# Patient Record
(demographics unavailable — no encounter records)

---

## 2024-07-23 NOTE — HISTORY OF PRESENT ILLNESS
[FreeTextEntry1] : 80 year old gentleman with history of HTN, CKD, HF, early dementia/falls, hypothyroidism, permanent AF on rate control, brain bleed, COPD, asthma, IGNACIA on nocturnal CPAP, mild LV dysfunction. He was deemed at high risk for recurrent brain bleed on long term anticoagulation, and at high risk of CVA off anticoagulation. He is now s/p MARIBETH occlusion with Watchman implant 11/9/21.Given his very high risk of bleeding, aspirin was not added to his Xarelto.  He had difficulty with rate control of his AF with occasions of prolonged tachycardia and time of bradycardia, therefore he underwent ILR implant 11/10/21. ILR has shown rate controlled persistent AF. He underwent a 45 post Watchman FREDDY which showed excellent results. His AC was stopped and he was started on DAPT which he has been tolerating well. Plavix was stopped at 6 months post WATCHMAN and he remains on ASA 81mg.  FREDDY and subsequent TTE reported new LV dysfunction with EF of 30-35% (previously 45%). Started on Entresto by Dr. Mackenzie. On 5/19/22, ILR notable for nonsustained MMVT - 7 seconds at 180bpm. Was Asymptomatic during NSVT event. Otherwise shows rate controlled AF with NO corina or tachyarrhythmia  Repeat TTE showed improved LVEF 50-55% but with regional wall abnormalities in the inferior and inferoseptal walls. Given recent findings of NSVT recommend to undergo pharm nuclear stress test for ischemic evaluation- no clear evidence of ischemia.   FREDDY on 6/2023. This showed LVEF continues to be 50--55% and with small (2.7 mm) leak around his Watchman device but no DRT.  ILR FOR RATE CONTROL INCOMPLETE NOTE NOT YET SEEN

## 2024-11-15 NOTE — ASSESSMENT
[FreeTextEntry1] : This is a 81-year-old man with dementia.  He has a prominent language component to it that this may be Alzheimer's disease versus a frontal temporal dementia.  To differentiate between the 2 I like to check PET scan for dementia.  I will continue his donepezil as he is tolerating it.  I will see him back in the office in 6 months but call his wife with the results of the PET scan and make any different treatment decisions as needed based upon the results.

## 2024-11-15 NOTE — CONSULT LETTER
[Dear  ___] : Dear  [unfilled], [Courtesy Letter:] : I had the pleasure of seeing your patient, [unfilled], in my office today. [Please see my note below.] : Please see my note below. [Consult Closing:] : Thank you very much for allowing me to participate in the care of this patient.  If you have any questions, please do not hesitate to contact me. [Sincerely,] : Sincerely, [FreeTextEntry3] : Toñito Toro M.D., Ph.D. DPN-N [facility] [place] Director, Division of Neurology Director, [facility]

## 2024-11-15 NOTE — HISTORY OF PRESENT ILLNESS
[FreeTextEntry1] : Initial office visit July 30, 2021: This is a 78-year-old man presents today for evaluation of memory loss. His wife accompanies him and provides additional history. She states over the past 2 years she's been having progressive memory problems. This is also accompanied by word finding difficulties and difficulty with her. He is repeating things he needs things repeated to him, he is having trouble managing his checkbook and his daughter has taken over that function. He is not driving currently. He is not wandering. He is having some aggressive behavior at times and prostration of memory loss. He is here today for neurologic evaluation.  Followup September 27, 2021: This is a 78-year-old man who presents today for followup of memory loss and agitation. His wife accompanies him and tells me that he is doing better. He has difficulty with finding words as well as some of his agitation aggression have improved since starting on the had an episode. He is not experiencing any side effects. He is here today for followup.  Followup February 15, 2022: This is a 78-year-old man who presents today for followup of dementia. His wife accompanies him and provides additional history. He has been fairly stable with recent memory loss. His wife states that he does reminisce a lot more about the past and having some trouble remembering some of the details. He has not progressed significantly since his last visit in September. He is here today for neurologic followup. He continues to take Aricept 10 mg daily.  Follow-up November 30, 2022: This is a 79-year-old man who presents today with dementia.  His wife accompanies him and provides additional history.  She states that his memory is a little bit worse than when he saw him last in February.  She states his judgment and decision-making skills however remain okay.  He continues to take Aricept 10 mg daily without problems.  He is here today for neurologic follow-up.  Follow-up March 10, 2023: This is a 79-year-old man who presents today with dementia.  His wife accompanies him and provides additional history.  She states that he has been fairly stable on donepezil 10 mg daily.  Except for over the past week he has been a bit off, this is likely due to anxiety for upcoming surgery to repair an umbilical hernia next week.  Other than that he has been fairly stable.  He is here today for neurologic follow-up.  Follow-up September 8, 2023: This is an 80-year-old man who presents today with dementia.  His wife accompanies him and provides additional history.  They both state that he has been doing okay.  She states that as long as things are at his pace he is doing well and able to remember.  When he is out with other people if he gets peppered with questions too quickly he may have difficulty answering some questions.  He states that he relies on her for certain things such as the date and she does most of the driving.  He does not drive unattended.  He is overall fairly stable since his last visit.  Follow-up July 23, 2024: This is an 80-year-old man who presents with dementia.  His wife accompanies him.  They state that he is may be a little bit worse.  He is also having problems now with walking.  He is unsteady and almost falls at times.  He has incontinence of urine but that is been present for years.  He is continuing to take donepezil and tolerating it well.  He is here today for follow-up.  Follow-up November 15, 2024: This is an 81-year-old man who presents with dementia.  His wife and aide accompany him.  She feels that that his forgetfulness is getting worse.  He is having language involvement as well.  He is having trouble walking and is unsteady and has been falling.  He also has incontinence of urine.  He has been taking donepezil and tolerating it well.  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  He is here today for neurologic follow-up.

## 2024-11-15 NOTE — PHYSICAL EXAM
[General Appearance - Alert] : alert [General Appearance - In No Acute Distress] : in no acute distress [General Appearance - Well Nourished] : well nourished [General Appearance - Well Developed] : well developed [Person] : oriented to person [Place] : oriented to place [Time] : disoriented to time [Short Term Intact] : short term memory impaired [Remote Intact] : remote memory intact [Registration Intact] : recent registration memory intact [Span Intact] : the attention span was decreased [Concentration Intact] : normal concentrating ability [Visual Intact] : visual attention was ~T not ~L decreased [Naming Objects] : no difficulty naming common objects [Repeating Phrases] : no difficulty repeating a phrase [Fluency] : fluency intact [Comprehension] : comprehension intact [Past History] : adequate knowledge of personal past history [Cranial Nerves Optic (II)] : visual acuity intact bilaterally,  visual fields full to confrontation, pupils equal round and reactive to light [Cranial Nerves Oculomotor (III)] : extraocular motion intact [Cranial Nerves Trigeminal (V)] : facial sensation intact symmetrically [Cranial Nerves Facial (VII)] : face symmetrical [Cranial Nerves Vestibulocochlear (VIII)] : hearing was intact bilaterally [Cranial Nerves Glossopharyngeal (IX)] : tongue and palate midline [Cranial Nerves Accessory (XI - Cranial And Spinal)] : head turning and shoulder shrug symmetric [Cranial Nerves Hypoglossal (XII)] : there was no tongue deviation with protrusion [Motor Tone] : muscle tone was normal in all four extremities [Motor Strength] : muscle strength was normal in all four extremities [Involuntary Movements] : no involuntary movements were seen [No Muscle Atrophy] : normal bulk in all four extremities [Paresis Pronator Drift Right-Sided] : no pronator drift on the right [Paresis Pronator Drift Left-Sided] : no pronator drift on the left [Motor Strength Upper Extremities Bilaterally] : strength was normal in both upper extremities [Motor Strength Lower Extremities Bilaterally] : strength was normal in both lower extremities [Sensation Tactile Decrease] : light touch was intact [Sensation Pain / Temperature Decrease] : pain and temperature was intact [Sensation Vibration Decrease] : vibration was intact [Proprioception] : proprioception was intact [Tremor] : no tremor present [Coordination - Dysmetria Impaired Finger-to-Nose Bilateral] : not present [1+] : Patella left 1+ [FreeTextEntry4] : 2 out of 3 recall at 5 minutes, 3 out of 3 with prompts [FreeTextEntry8] : slow cautious gait [Sclera] : the sclera and conjunctiva were normal [PERRL With Normal Accommodation] : pupils were equal in size, round, reactive to light, with normal accommodation [Extraocular Movements] : extraocular movements were intact [No APD] : no afferent pupillary defect [No ALTON] : no internuclear ophthalmoplegia [Full Visual Field] : full visual field

## 2024-11-15 NOTE — REVIEW OF SYSTEMS
[As Noted in HPI] : as noted in HPI [Confused or Disoriented] : confusion [Memory Lapses or Loss] : memory loss [Decr. Concentrating Ability] : decreased concentrating ability [Repeating Questions] : repeated questioning about recent events [Difficulty Walking] : difficulty walking [Negative] : Heme/Lymph

## 2024-12-02 NOTE — PHYSICAL EXAM
[No Acute Distress] : no acute distress [Normal S1, S2] : normal S1, S2 [Clear Lung Fields] : clear lung fields [No Respiratory Distress] : no respiratory distress  [No Edema] : no edema [Moves all extremities] : moves all extremities [Alert and Oriented] : alert and oriented

## 2024-12-02 NOTE — REASON FOR VISIT
[Arrhythmia/ECG Abnorrmalities] : arrhythmia/ECG abnormalities [Follow-up Device Check] : is here today for a follow-up device check visit for [Spouse] : spouse

## 2024-12-03 NOTE — END OF VISIT
[Time Spent: ___ minutes] : I have spent [unfilled] minutes of time on the encounter which excludes teaching and separately reported services. [FreeTextEntry3] : I, Dr. Conrad, personally performed the evaluation and management (E/M) services for this new patient. That E/M includes conducting the clinically appropriate initial history &/or exam, assessing all conditions, and establishing the plan of care. Today, my assistant, Elinor Argueta NP, was here to observe my evaluation and management service for this patient & follow plan of care established by me going forward.I, Dr. Conrad, personally performed the evaluation and management (E/M) services for this new patient. That E/M includes conducting the clinically appropriate initial history &/or exam, assessing all conditions, and establishing the plan of care. Today, my assistant, Elinor Argueta NP, was here to observe my evaluation and management service for this patient & follow plan of care established by me going forward.

## 2024-12-03 NOTE — REVIEW OF SYSTEMS
[Fever] : no fever [Headache] : no headache [Chills] : no chills [Feeling Fatigued] : not feeling fatigued [SOB] : no shortness of breath [Dyspnea on exertion] : not dyspnea during exertion [Chest Discomfort] : no chest discomfort [Lower Ext Edema] : no extremity edema [Palpitations] : no palpitations [Orthopnea] : no orthopnea [PND] : no PND [Syncope] : no syncope [Cough] : no cough [Wheezing] : no wheezing [Nausea] : no nausea [Vomiting] : no vomiting [Dizziness] : no dizziness [Easy Bleeding] : no tendency for easy bleeding [Easy Bruising] : no tendency for easy bruising

## 2024-12-03 NOTE — DISCUSSION/SUMMARY
[EKG obtained to assist in diagnosis and management of assessed problem(s)] : EKG obtained to assist in diagnosis and management of assessed problem(s) [FreeTextEntry1] : 81-year-old gentleman with history of HTN, CKD, HF, early dementia/falls, hypothyroidism, permanent AF on rate control, brain bleed, COPD, asthma, IGNACIA on nocturnal CPAP, mild LV dysfunction. He was deemed at high risk for recurrent brain bleed on long term anticoagulation, and at high risk of CVA off anticoagulation. He is now s/p MARIBETH occlusion with Watchman implant 11/9/21.Given his very high risk of bleeding, Xarelto was no restarted and patient now remains on ASA only. ILR implanted 11/2021.   Patient presented today for arrhythmia f/u. He has been doing well from an arrhythmia standpoint. He remains in permanent A-fib but rates are controlled on Metoprolol 100mg and Digoxin. He is feeling well overall. No other arrhythmia events noted on ILR. Will discuss at next visit whether he wants to continue ILR monitoring or explant.    Recommendations -Continue remote monitoring of ILR.  - Continue current dose of Metoprolol ER 100mg QD.  - Continue Lifelong ASA as tolerated - Continue GDMT per Dr. Mackenzie -EP f/u annually or sooner PRN.

## 2024-12-03 NOTE — ASSESSMENT
[FreeTextEntry1] : Patient with permanent AF/AFl, mostly rate controlled on current medicatiosn. Has ILR which showed reasonable rate control. Had intolerance to AC in the past and now s/p MARIBETH closure with Watchman as above. Overall doing well and denies CP, SOB, KRAUS, dizziness, palpitations, syncope or presyncope. - C/w current dose of BB, digoxin and aspirin. - c/w remote monitoring of his ILR. - EP follow up in 9 months or sooner if needed. - c/w routine cardiac follow up with Dr. Mackenzie.

## 2024-12-03 NOTE — HISTORY OF PRESENT ILLNESS
[FreeTextEntry1] : 81-year-old gentleman with history of HTN, CKD, HF, early dementia/falls, hypothyroidism, permanent AF on rate control, brain bleed, COPD, asthma, IGNACIA on nocturnal CPAP, mild LV dysfunction. He was deemed at high risk for recurrent brain bleed on long term anticoagulation, and at high risk of CVA off anticoagulation. He is now s/p MARIBETH occlusion with Watchman implant 11/9/21.Given his very high risk of bleeding, aspirin was not added to his Xarelto.   He had difficulty with rate control of his AF with occasions of prolonged tachycardia and time of bradycardia, therefore he underwent ILR implant 11/10/21. ILR has shown rate controlled persistent AF.  He underwent a 45 day post Watchman FREDDY which showed excellent results. His AC was stopped and he was started on DAPT which he has been tolerating well.  Plavix was stopped at 6 months post WATCHMAN and he remains on ASA 81mg.   FREDDY and subseuqent TTE reported new LV dysfunction with EF of 30-35% (previously 45%).  Started on Entresto by Dr. Mackenzie.  On 5/19/22, ILR notable for non-sustained MMVT, 7 seconds at 180bpm. Was asymptomatic during NSVT event.  Otherwise shows rate-controlled AF with NO corina or tachyarrhythmia   Repeat TTE showed improved LVEF 50-55% but with regional wall abnormalities in the inferior and inferoseptal walls. Given recent findings of NSVT recommend to undergo pharm nuclear stress test for ischemic evaluation. No longer candidate for primary prevention ICD. Small (2.7 mm) leak around his Watchman device but no DRT. He remained on aspirin.   He presents today for arrhythmia f/u. He is in persistent Afib with rate control. He denies symptoms of arrhythmia without c/o palpitations, chest pain/tightness, dizziness, syncope or near syncope.  ILR interrogation reveals % burden, no other arrhythmia events noted.   EKG today reveals:  AF HR 80.

## 2024-12-16 NOTE — HISTORY OF PRESENT ILLNESS
[de-identified] : 82 y/o male with pmhx of HLD, Alzheimer's, hypothyroidism, a-fib s/p Xarelto now on DAPT per cardio, CHF (EF 45%), COPD/asthma, IGNACIA on CPAP, HTN, s/p Watchman and ILR on 11/09/2021 presents for follow up. Patient accompanied by wife. He is overall stable, no acute issues  Wife notes that patient recently had what she thought was cellulitis on his right foot. It was painful and warm. She gave him antibiotics which she states resolved. States skin is very shiny still with chronic discoloration and swelling.  Care team: Neurosx: Lupe Cardio: Gurdeep Neuro: Rick Pulm: Renee GI: [facility] at [facility]

## 2024-12-16 NOTE — ASSESSMENT
[FreeTextEntry1] : Hypothyroidism will check tfts today Continue levothyroxine 175 mcg QAM  Dementia continue memantine 10mg daily and donepezil 10mg daily continue to follow with neuro  HTN CKD-3 Controlled on current regimen. Managed by cardio. Will see nephrology Dr Hernandez. Advised to avoid nephrotoxic medications, increase his oral hydration.  COPD following with pulm continue trelegy ellipta and levalbuterol  A-fib, rate controlled. Managed by cardio. continue digoxin 125mcg every other day and metoprolol succinate 100mg daily S/p Watchman, still on Xarelto  CHF euvolemic on exam on furosemide 40mg daily   Skin discoloration will refer to vacsular for further evaluation

## 2024-12-16 NOTE — PHYSICAL EXAM
[No Focal Deficits] : no focal deficits [Normal] : affect was normal and insight and judgment were intact [de-identified] : 1+ pitting edema b/l LE [de-identified] : chronic skin discoloration b/l LE

## 2024-12-17 NOTE — DISCUSSION/SUMMARY
[EKG obtained to assist in diagnosis and management of assessed problem(s)] : EKG obtained to assist in diagnosis and management of assessed problem(s) [FreeTextEntry1] : 81M with persistent Afib on Xarelto(EF45-50% 12/2020), COPD, obesity (BMI 34), IGNACIA, hypothyroidism, and asthma previously follows with outside cardiologist (Dr. Piedra), presented to the ED by ambulance with recurrent ADHF, flash pulmonary edema required BiPAP placement and persistent Afib RVR on 6/9-6/15/21 (prior admissions 12/2020) found with worsening LV EF 40-45%, RAISSA (Cr. 1.4), change in mental state and CT/MRI head confirmed nontraumatic intracerebral hemorrhage monitored off Xarelto per neurosurgery for 2 weeks, seen by EPS/Dr. Peacock for elective outpatient Watchman, added digoxin 0.125mg for Afib rate control, presented for initial outpatient cardiology visit on 6/2021, readmitted 7/2021 for mechanical fall with head trauma and facial laceration, carvedilol dose reduced to 12.5mg BID and discontinued digoxin due to bradycardia 40s, increased lisinopril 40mg and added spironolactone 25mg , last office visit 8/17/21 with Afib -150s and MCOT placed increased carvedilol back to 25mg BID, also with recently diagnosed Alzheimer dementia, last seen for general cardiology visit on 8/2021, interval had Watchman procedure done 11/2021 discontinued Xarelto 2/2022, [employer] ILR implant, LV EF on FREDDY 30-35%, positive for COVID in 1/2022, underwent FREDDY  on 2/2/2022 post Watchman which revealed Watchman in good position, no leaks or thrombus, recent breast soreness had mammogram found gynecomastia been on spironolactone since 8/2021, switched to eplerenone last 5/3. Had repeat Echocardiogram 6/1/2022 with LVEF improved to 50-55% but with regional wall abnormalities in the inferior and inferoseptal walls; underwent pharmacological NST last 6/16 due RWMA in Echo and NSVT on Loop Recorder - no clear evidence of ischemia; repeat Echo 6/2022 with LV EF 50-55%, seen 7/2022 with acute visit for dyspnea/wheezing no signs of heart failure and CXR negative, suspect COPD exacerbation was given Medrol dose pack and azithromycin by pulmonary, seen on 2/2023 pre-operative cardiac assessment prior to robotic umbilical hernia repair with Dr Sosa, recovered well, no complications, last seen 6/2023 with SBP 90 discontinue Entresto, had repeat FREDDY on 6/2023 LVEF continues 50--55% and with small (2.7 mm) leak around his Watchman device but no DRT, prior visit 10/2023 for preprocedural cardiac risk eval prior to EGD/colonoscopy, awaiting MRI Abdomen due to lesion of left kidney as per nephrology, last seen 5/2024, last seen 9/2024, returns for follow up.  Overall stable except for chronic unsteady gait, denies recent falls or bleeding episodes. Permanent Afib rate controlled no alerts of prolonged pauses on ILR remains on low dose digoxin and metoprolol.   1.Chronic HFrEF:  recovered EF (50 to 55% 6/2022) Euvolemic on exam continue Lasix 40mg, and metoprolol succinate.   has not been taking Eplerenone, recent lab Cr. 1.38 (GFR 51) and K 4.4 but given SBP 120s can defer for now Weight discrepancy yesterday to today likely due to states weight recorded yesterday and weighed in office today. Jonelle confirms 240 likely typical weight for patient. Advised to monitor weight at home.   2. HTN: previously hypotensive on Entresto, BP has remained stable off Entresto, to remain off for now  3. Persistent Afib with prior RVR, NSVT- ILR followed by EP no event 4/2024 - rate is now controlled on metoprolol succinate 100mg, continue  digoxin 0.125mg every other day, digoxin level was in range on labs 12/2023 needs repeat. On ASA 81 mg daily, no bleeding episodes  -s/p Watchman 11/2021  4. Prior recurrent ICH/SDH : denies falls/ syncope, off AC, on ASA 81 mg  5. Renal lesion: per MRI as L-renal hemorrhagic/proteinaceous cyst.   6. Unsteady gait- at risk for recurrent falls. Following neurology as well.   7. Chronic diarrhea- stable  8. Advised PCP follow up for elevated TSH.  Follow up with Dr. Mackenzie in 4 months, sooner if needed.  Patient was advised to contact the office for any new, worsening or concerning symptoms. Patient verbalized understanding and is in agreement with the above plan.  Annette Gaston was present in office at the time of visit.

## 2024-12-17 NOTE — PHYSICAL EXAM
[No Acute Distress] : no acute distress [Obese] : obese [No Carotid Bruit] : no carotid bruit [Normal S1, S2] : normal S1, S2 [No Murmur] : no murmur [Clear Lung Fields] : clear lung fields [No Respiratory Distress] : no respiratory distress  [Edema ___] : edema [unfilled] [Venous varicosities] : venous varicosities [Moves all extremities] : moves all extremities [Normal Speech] : normal speech [Alert and Oriented] : alert and oriented [de-identified] : using cane and assistance from family  [de-identified] : skin pink BLLE

## 2024-12-17 NOTE — CARDIOLOGY SUMMARY
[de-identified] : 6/21/21- Afib 63, LAFB, QTc 435 8/23/21- Afib 112, LAFB, QTc 453 5/3/22- Afib 77 short pause <2 seconds, LAFB, QTc 429 11/14/22- Afib 65, LAFB, PVC, QTc 420 02/16/2023  Afib 81 LAFB, QTc 460 04/18/2023 Afib 84, QTc 422 6/12/2023 Afib 64, QTc 403 10/5/23- Afib 98, LAFB, QTc 477 2/12/24 Afib 63 LAFB, QTc 430 5/21/24 Afib 79. PVC x1, nonspecific ST, QTc 455 9/30/24 Afib 81, left axis, nonspecific T-wave, QTc 476 12/17/2024: Afib 89, left axis and nonspecific T abnormality, consistent with prior. QTc 471 [de-identified] : 6/2022 Pharmacologic nuclear stress test: very mild distal anterior wall attenuation artifact, no clear evidence of ischemia or infarct  [de-identified] : 6/1/22- LV EF 53%, mild LVH,  inferior/inferoseptal mild hypokinesis, PASP 33 mmHg, mild biatrial enlargement\par  \par  2/2022- \par  Summary:\par   1. Technically fair study. Due to patient's respiratory status, limited \par  images were obtained.\par   2. Moderately decreased global left ventricular systolic function.\par   3. Left ventricular ejection fraction, by visual estimation, is 30 to \par  35%.\par   4. Left atrial enlargement.\par   5. Mildly reduced RV systolic function.\par   6. There is a well seated WATCHMAN device occluding the left atrai \par  lappendage. The device is free of thrombus. There is no significant color \par  flow around the device.\par   7. Normal right atrial size.\par   8. Mild mitral valve regurgitation.\par   9. Mild tricuspid regurgitation.\par  10. There is no evidence of pericardial effusion.\par  \par  6/10/21- Summary: \par   1. Endocardial visualization was enhanced with intravenous echo contrast. \par   2. Left ventricular ejection fraction, by visual estimation, is 40 to 45%. \par   3. Mildly to moderately decreased global left ventricular systolic function. \par   4. Basal and mid anterior septum and basal and mid inferior septum are abnormal as described above. \par   5. The mitral in-flow pattern reveals no discernable A-wave, therefore no comment on diastolic function can be made. \par   6. There is mild concentric left ventricular hypertrophy. \par   7. Normal left ventricular internal cavity size. \par   8. Severely enlarged left atrium. \par   9. Moderately enlarged right atrium. \par  10. Mild mitral annular calcification. \par  11. Mild thickening and calcification of the anterior and posterior mitral valve leaflets. \par  12. Mild mitral valve regurgitation. \par  13. Mild aortic valve stenosis. \par  14. Mild aortic regurgitation. \par  15. There is no evidence of pericardial effusion.

## 2025-04-07 NOTE — HISTORY OF PRESENT ILLNESS
[FreeTextEntry1] : 81M with persistent Afib on Xarelto(EF45-50% 12/2020), COPD, obesity (BMI 34), IGNACIA, hypothyroidism, and asthma previously follows with outside cardiologist (Dr. Piedra), presented to the ED by ambulance with recurrent ADHF, flash pulmonary edema required BiPAP placement and persistent Afib RVR on 6/9-6/15/21 (prior admissions 12/2020) found with worsening LV EF 40-45%, RAISSA (Cr. 1.4), change in mental state and CT/MRI head confirmed nontraumatic intracerebral hemorrhage monitored off Xarelto per neurosurgery for 2 weeks, seen by EPS/Dr. Peacock for elective outpatient Watchman, added digoxin 0.125mg for Afib rate control, presented for initial outpatient cardiology visit on 6/2021, readmitted 7/2021 for mechanical fall with head trauma and facial laceration, carvedilol dose reduced to 12.5mg BID and discontinued digoxin due to bradycardia 40s, increased lisinopril 40mg and added spironolactone 25mg , last office visit 8/17/21 with Afib -150s and MCOT placed increased carvedilol back to 25mg BID, also with recently diagnosed Alzheimer dementia, last seen for general cardiology visit on 8/2021, interval had Watchman procedure done 11/2021 discontinued Xarelto 2/2022, Medtronic ILR implant, LV EF on FREDDY 30-35%, positive for COVID in 1/2022, underwent FREDDY  on 2/2/2022 post Watchman which revealed Watchman in good position, no leaks or thrombus, recent breast soreness had mammogram found gynecomastia been on spironolactone since 8/2021, switched to eplerenone last 5/3. Had repeat Echocardiogram 6/1/2022 with LVEF improved to 50-55% but with regional wall abnormalities in the inferior and inferoseptal walls; underwent pharmacological NST last 6/16 due RWMA in Echo and NSVT on Loop Recorder - no clear evidence of ischemia; repeat Echo 6/2022 with LV EF 50-55%, seen 7/2022 with acute visit for dyspnea/wheezing no signs of heart failure and CXR negative, suspect COPD exacerbation was given Medrol dose pack and azithromycin by pulmonary, seen on 2/2023 pre-operative cardiac assessment prior to robotic umbilical hernia repair with Dr Sosa, recovered well, no complications, last seen 6/2023 with SBP 90 discontinue Entresto, had repeat FREDDY on 6/2023 LVEF continues 50--55% and with small (2.7 mm) leak around his Watchman device but no DRT, prior visit 10/2023 for preprocedural cardiac risk eval prior to EGD/colonoscopy, awaiting MRI Abdomen due to lesion of left kidney as per nephrology, last seen 5/2024, last seen 12/2024 interval had recurrent falls x3 in one week resulted with L-knee hemoarthrosis discharged to rehab for 3 months, returns for follow up.  Patient presents with his spouse for the visit, reports feeling well no recurrent falls since discharge from rehab, able to do some walking at home with HHA also with home PT, planning travel to [place] for 2 weeks and possible cruise to [place].    Prior visit 12/2024:  Reports feeling well. Accompanied by his long term girlfriend Jonelle. Reports feeling good. Denies chest pain, SOB at rest, KRAUS, palpitations, lightheadedness, dizziness, fatigue, syncope, near syncope and LE edema. States his PCP recommended vascular consult due to right foot pain which occurred last week, minor foot swelling and skin redness. Patient states right foot symptoms have now resolved, although Jonelle reports redness is still slightly there. Denies smoking, excessive alcohol and illicit drug use. Denies recent falls or bleeding episodes. Compliant with meds. Reports limited mobility, uses a cane and holds onto another person for assistance. He is pending the results of a brain MRI ordered by Neurology.    Prior visit 9/30/2024: Patient presents with his wife for the visit, reports feeling well, has not been active with ambulation but no recent fall, has not been to PT, planning on trip to [place] later this week. Had CT head done in August questionable for NPH pending neurology follow up. Monthly ILR with rate controlled Afib without alerts.    Prior visit 5/2024:  Patient presents with his wife for the visit, reports feeling well but had colonoscopy told normal still having diarrhea requires daily use of Imodium, wife reports his gait is unsteady pending to get a walker to ambulate, has not been to PT for few years. Planning travels to different locations next few months with his wife.    Prior visit 2/2024:  Presents with his wife. No complaints she states he has been doing well. Denies chest pain, SOB/KRAUS, PND, orthopnea, LE edema, palpitations, lightheadedness, syncope or any bleeding episodes.  unclear if pt is taking Eplerenone, wife knows he was taking it unsure if he still is.  Weight has been stable as per pts wife  Prior visit 10/2023 Patient presents with his wife for the visit, denies chest pain or shortness of breath, no palpitations, reports feeling well, gets tired when walk more, took a cruise to [place] but used scooter to get around mostly. Pending CT ab/pel as well, last colonoscopy >5yrs.    Prior visit 8/2023:  Patient presents with his wife for the visit, reports has been off carvedilol 25mg recently as well, had recent CT chest with small L-pleural effusion, denies shortness of breath, lost about 30 lbs since 4/2023, bothered by chronic diarrhea taking Imotil, pending GI eval, last colonoscopy 3yrs ago with another GI. No recent fall but no ambulating much.   Prior visit 6/2023:  Pt is accompanied by his wife BP noted to be low, pt states feels fine Has BP machine at home, does not follow BP at home Lost weight while sick with cough/ URI, but now feeling well, eating well ate cheerios this morning with 1/2 cup coffee, no water,  Denies chest pain, SOB/KRAUS, PND, orthopnea, LE edema, palpitations, lightheadedness,syncope or any bleeding episodes Not yet followed up with EP    Prior visit 4/18/2023 Pt is accompanied by his wife Umbilical hernia repair was successful, no complications, remained on ASA with no bleeding Pt  is seen today for URI symptoms which began last week, began with runny nose, cough, unable to expectorate mucus, fatigue, wife noticed labored breathing,  O2 sat 90-92%, went to [facility] urgent care on 4/14/23, negative for FLU and COVID, chest XRAY showed clear lungs, no pleural effusion. Wife had Cefadroxil at home which she began giving the pt 2 days ago and is now seeing an improvement. Pt is feeling better, still with cough but O2 sat has improved, energy has improved, eating drinking well, denies fever/chills,or  sore throat. Denies chest pain, SOB, LE edema, dizziness, near syncope, syncope   Prior Note 2/16/2023 He is accompanied by his wife Pt and wife state they travel often and do not want any complications from the umbilical hernia which has  recently increased in size. He reports feeling well, good energy level He started exercising at the gym 3 weeks ago doing 15 min on stationary bike and lifting light weight, without any symptoms He denies any chest pain, SOB/KRAUS, orthopnea, PND, palpitations, dizziness, lightheadedness, falls, syncope  He has chronic minimal LE edema noticed by " tight socks leaving marks" but denies any worsening edema, stopped wearing compression stockings  he has a h/o IGNACIA, not compliant with CPAP BP noted to be running low but reports he is asymptomatic, does not monitor BP at home    Prior Note 11/2022 Patient presents with his wife for the visit, recently returned from [place] last weekend reports noticing increased legs swelling, had been eating out more in [place], denies shortness of breath, no recent fall. Denies symptoms from umbilical hernia. Wife also noted he has hearing impairment and worsening of his memory loss, missed neurology follow up appointment.    Prior visit 8/2022:  Patient presents with his wife for the visit.  Reports feeling well, shortness of breath resolved, but has gained weight, not been exercising recently. Denies chest pain or palpitations, no fall.    Not yet obtain 2nd COVID vaccine booster.    Prior visit 7/2022:  Eugene is accompanied by his wife for the visit. Has shortness of breath since this morning with lowest O2 sat of 90% (RA) at home. He has dry cough according to his wife since yesterday; does not get worse when he's in bed. Wife states that he seem lethargic since morning. Eugene denies chest pain; no weight gain since last month's visit and no LE edema. Has scattered expiratory wheeze, but no crackles. He had used only one dose of albuterol inhaler today.   Prior visit 5/3/2022 Patient presents with his wife for the visit.  Reports feeling well, no recent fall, memory about the same, been going between [place] and has sunburn. Recently he has been going to the gym using stationary bicycle, no shortness of breath or chest discomfort.   Not yet receive 2nd COVID vaccine booster   Prior visit 8/2021 Patient presents with his wife for the visit, still has MCOT device on until next week, does not feels any symptoms from Afib RVR, she reports HR been slowing down since increase with carvedilol dose, BP also been better controlled, no recent fall or bleeding, using cane to ambulate and receiving home PT, still off Xarelto and pending repeat CT head in 2 weeks.    Last visit 8/17/21:  Today his presents with his wife (who is a nurse) who states he has increased fatigue and SOB with exertion, HR 130s, and swelling. He was restarted on lasix and spironolactone added last week for b/l leg edema and abd distention. Wife states she gave him 80 mg of lasix yesterday, due to increased edema. She states he lost 5 bs over the last week. He denies chest pain, palpitations, orthopnea (sleeps with 2 pilows- states this is his norm), near syncope or syncope. He reports he feels ok now, but this morning told his wife "I feel like I'm dying". EKG is AF with RVR.    Prior visit 6/2021:  Patient presents with his wife who is an RN for the visit. Reports feeling well since discharge, no shortness of breath or orthopnea, LE swellings stable, has baseline exertional dyspnea and limited ambulation, pending appointment to see pulmonary. He denies any palpitations or dizziness, wife noticed HR of 58 once last week, however review of his discharge med list revealed he was incorrectly resumed back on diltiazem 360mg as well.   Completed Moderna COVID vaccine 3 months ago.   6/2021:  , , HDL 25, LDL 87 A1c 5.5%

## 2025-04-07 NOTE — CARDIOLOGY SUMMARY
[de-identified] : 6/21/21- Afib 63, LAFB, QTc 435 8/23/21- Afib 112, LAFB, QTc 453 5/3/22- Afib 77 short pause <2 seconds, LAFB, QTc 429 11/14/22- Afib 65, LAFB, PVC, QTc 420 02/16/2023  Afib 81 LAFB, QTc 460 04/18/2023 Afib 84, QTc 422 6/12/2023 Afib 64, QTc 403 10/5/23- Afib 98, LAFB, QTc 477 2/12/24 Afib 63 LAFB, QTc 430 5/21/24 Afib 79. PVC x1, nonspecific ST, QTc 455 9/30/24 Afib 81, left axis, nonspecific T-wave, QTc 476 12/17/2024: Afib 89, left axis and nonspecific T abnormality, consistent with prior. QTc 471 [de-identified] : 6/2022 Pharmacologic nuclear stress test: very mild distal anterior wall attenuation artifact, no clear evidence of ischemia or infarct  [de-identified] : 6/1/22- LV EF 53%, mild LVH,  inferior/inferoseptal mild hypokinesis, PASP 33 mmHg, mild biatrial enlargement\par  \par  2/2022- \par  Summary:\par   1. Technically fair study. Due to patient's respiratory status, limited \par  images were obtained.\par   2. Moderately decreased global left ventricular systolic function.\par   3. Left ventricular ejection fraction, by visual estimation, is 30 to \par  35%.\par   4. Left atrial enlargement.\par   5. Mildly reduced RV systolic function.\par   6. There is a well seated WATCHMAN device occluding the left atrai \par  lappendage. The device is free of thrombus. There is no significant color \par  flow around the device.\par   7. Normal right atrial size.\par   8. Mild mitral valve regurgitation.\par   9. Mild tricuspid regurgitation.\par  10. There is no evidence of pericardial effusion.\par  \par  6/10/21- Summary: \par   1. Endocardial visualization was enhanced with intravenous echo contrast. \par   2. Left ventricular ejection fraction, by visual estimation, is 40 to 45%. \par   3. Mildly to moderately decreased global left ventricular systolic function. \par   4. Basal and mid anterior septum and basal and mid inferior septum are abnormal as described above. \par   5. The mitral in-flow pattern reveals no discernable A-wave, therefore no comment on diastolic function can be made. \par   6. There is mild concentric left ventricular hypertrophy. \par   7. Normal left ventricular internal cavity size. \par   8. Severely enlarged left atrium. \par   9. Moderately enlarged right atrium. \par  10. Mild mitral annular calcification. \par  11. Mild thickening and calcification of the anterior and posterior mitral valve leaflets. \par  12. Mild mitral valve regurgitation. \par  13. Mild aortic valve stenosis. \par  14. Mild aortic regurgitation. \par  15. There is no evidence of pericardial effusion.

## 2025-04-07 NOTE — DISCUSSION/SUMMARY
[FreeTextEntry1] : 81M with persistent Afib on Xarelto(EF45-50% 12/2020), COPD, obesity (BMI 34), IGNACIA, hypothyroidism, and asthma previously follows with outside cardiologist (Dr. Piedra), presented to the ED by ambulance with recurrent ADHF, flash pulmonary edema required BiPAP placement and persistent Afib RVR on 6/9-6/15/21 (prior admissions 12/2020) found with worsening LV EF 40-45%, RAISSA (Cr. 1.4), change in mental state and CT/MRI head confirmed nontraumatic intracerebral hemorrhage monitored off Xarelto per neurosurgery for 2 weeks, seen by EPS/Dr. Peacock for elective outpatient Watchman, added digoxin 0.125mg for Afib rate control, presented for initial outpatient cardiology visit on 6/2021, readmitted 7/2021 for mechanical fall with head trauma and facial laceration, carvedilol dose reduced to 12.5mg BID and discontinued digoxin due to bradycardia 40s, increased lisinopril 40mg and added spironolactone 25mg , last office visit 8/17/21 with Afib -150s and MCOT placed increased carvedilol back to 25mg BID, also with recently diagnosed Alzheimer dementia, last seen for general cardiology visit on 8/2021, interval had Watchman procedure done 11/2021 discontinued Xarelto 2/2022, Medtronic ILR implant, LV EF on FREDDY 30-35%, positive for COVID in 1/2022, underwent FREDDY  on 2/2/2022 post Watchman which revealed Watchman in good position, no leaks or thrombus, recent breast soreness had mammogram found gynecomastia been on spironolactone since 8/2021, switched to eplerenone last 5/3. Had repeat Echocardiogram 6/1/2022 with LVEF improved to 50-55% but with regional wall abnormalities in the inferior and inferoseptal walls; underwent pharmacological NST last 6/16 due RWMA in Echo and NSVT on Loop Recorder - no clear evidence of ischemia; repeat Echo 6/2022 with LV EF 50-55%, seen 7/2022 with acute visit for dyspnea/wheezing no signs of heart failure and CXR negative, suspect COPD exacerbation was given Medrol dose pack and azithromycin by pulmonary, seen on 2/2023 pre-operative cardiac assessment prior to robotic umbilical hernia repair with Dr Sosa, recovered well, no complications, last seen 6/2023 with SBP 90 discontinue Entresto, had repeat FREDDY on 6/2023 LVEF continues 50--55% and with small (2.7 mm) leak around his Watchman device but no DRT, prior visit 10/2023 for preprocedural cardiac risk eval prior to EGD/colonoscopy, awaiting MRI Abdomen due to lesion of left kidney as per nephrology, last seen 5/2024, last seen 9/2024, returns for follow up.  Overall stable except for chronic unsteady gait, denies recent falls or bleeding episodes. Permanent Afib rate controlled no alerts of prolonged pauses on ILR remains on low dose digoxin and metoprolol.   1.Chronic HFrEF:  recovered EF (50 to 55% 6/2022) Euvolemic on exam continue Lasix 40mg, and metoprolol succinate.   has not been taking Eplerenone, recent lab Cr. 1.38 (GFR 51) and K 4.4 but given SBP 120s can defer for now Weight discrepancy yesterday to today likely due to states weight recorded yesterday and weighed in office today. Jonelle confirms 240 likely typical weight for patient. Advised to monitor weight at home.   2. HTN: previously hypotensive on Entresto, BP has remained stable off Entresto, to remain off for now  3. Persistent Afib with prior RVR, NSVT- ILR followed by EP no event 4/2024 - rate is now controlled on metoprolol succinate 100mg, continue  digoxin 0.125mg every other day, digoxin level was in range on labs 12/2023 needs repeat. On ASA 81 mg daily, no bleeding episodes  -s/p Watchman 11/2021  4. Prior recurrent ICH/SDH : denies falls/ syncope, off AC, on ASA 81 mg  5. Renal lesion: per MRI as L-renal hemorrhagic/proteinaceous cyst.   6. Unsteady gait- at risk for recurrent falls. Following neurology as well.   7. Chronic diarrhea- stable  8. Advised PCP follow up for elevated TSH.  Follow up with Dr. Mackenzie in 4 months, sooner if needed.  Patient was advised to contact the office for any new, worsening or concerning symptoms. Patient verbalized understanding and is in agreement with the above plan.  Annette Gaston was present in office at the time of visit.

## 2025-04-07 NOTE — HISTORY OF PRESENT ILLNESS
[FreeTextEntry1] : 81M with persistent Afib on Xarelto(EF45-50% 12/2020), COPD, obesity (BMI 34), IGNACIA, hypothyroidism, and asthma previously follows with outside cardiologist (Dr. Piedra), presented to the ED by ambulance with recurrent ADHF, flash pulmonary edema required BiPAP placement and persistent Afib RVR on 6/9-6/15/21 (prior admissions 12/2020) found with worsening LV EF 40-45%, RAISSA (Cr. 1.4), change in mental state and CT/MRI head confirmed nontraumatic intracerebral hemorrhage monitored off Xarelto per neurosurgery for 2 weeks, seen by EPS/Dr. Peacock for elective outpatient Watchman, added digoxin 0.125mg for Afib rate control, presented for initial outpatient cardiology visit on 6/2021, readmitted 7/2021 for mechanical fall with head trauma and facial laceration, carvedilol dose reduced to 12.5mg BID and discontinued digoxin due to bradycardia 40s, increased lisinopril 40mg and added spironolactone 25mg , last office visit 8/17/21 with Afib -150s and MCOT placed increased carvedilol back to 25mg BID, also with recently diagnosed Alzheimer dementia, last seen for general cardiology visit on 8/2021, interval had Watchman procedure done 11/2021 discontinued Xarelto 2/2022, Medtronic ILR implant, LV EF on FREDDY 30-35%, positive for COVID in 1/2022, underwent FREDDY  on 2/2/2022 post Watchman which revealed Watchman in good position, no leaks or thrombus, recent breast soreness had mammogram found gynecomastia been on spironolactone since 8/2021, switched to eplerenone last 5/3. Had repeat Echocardiogram 6/1/2022 with LVEF improved to 50-55% but with regional wall abnormalities in the inferior and inferoseptal walls; underwent pharmacological NST last 6/16 due RWMA in Echo and NSVT on Loop Recorder - no clear evidence of ischemia; repeat Echo 6/2022 with LV EF 50-55%, seen 7/2022 with acute visit for dyspnea/wheezing no signs of heart failure and CXR negative, suspect COPD exacerbation was given Medrol dose pack and azithromycin by pulmonary, seen on 2/2023 pre-operative cardiac assessment prior to robotic umbilical hernia repair with Dr Sosa, recovered well, no complications, last seen 6/2023 with SBP 90 discontinue Entresto, had repeat FREDDY on 6/2023 LVEF continues 50--55% and with small (2.7 mm) leak around his Watchman device but no DRT, prior visit 10/2023 for preprocedural cardiac risk eval prior to EGD/colonoscopy, awaiting MRI Abdomen due to lesion of left kidney as per nephrology, last seen 5/2024, last seen 12/2024 interval had recurrent falls x3 in one week resulted with L-knee hemoarthrosis discharged to rehab for 3 months, returns for follow up.  Patient presents with his spouse for the visit, reports feeling well no recurrent falls since discharge from rehab, able to do some walking at home with HHA also with home PT, planning travel to [place] for 2 weeks and possible cruise to [place].    Prior visit 12/2024:  Reports feeling well. Accompanied by his long term girlfriend Jonelle. Reports feeling good. Denies chest pain, SOB at rest, KRAUS, palpitations, lightheadedness, dizziness, fatigue, syncope, near syncope and LE edema. States his PCP recommended vascular consult due to right foot pain which occurred last week, minor foot swelling and skin redness. Patient states right foot symptoms have now resolved, although Jonelle reports redness is still slightly there. Denies smoking, excessive alcohol and illicit drug use. Denies recent falls or bleeding episodes. Compliant with meds. Reports limited mobility, uses a cane and holds onto another person for assistance. He is pending the results of a brain MRI ordered by Neurology.    Prior visit 9/30/2024: Patient presents with his wife for the visit, reports feeling well, has not been active with ambulation but no recent fall, has not been to PT, planning on trip to [place] later this week. Had CT head done in August questionable for NPH pending neurology follow up. Monthly ILR with rate controlled Afib without alerts.    Prior visit 5/2024:  Patient presents with his wife for the visit, reports feeling well but had colonoscopy told normal still having diarrhea requires daily use of Imodium, wife reports his gait is unsteady pending to get a walker to ambulate, has not been to PT for few years. Planning travels to different locations next few months with his wife.    Prior visit 2/2024:  Presents with his wife. No complaints she states he has been doing well. Denies chest pain, SOB/KRAUS, PND, orthopnea, LE edema, palpitations, lightheadedness, syncope or any bleeding episodes.  unclear if pt is taking Eplerenone, wife knows he was taking it unsure if he still is.  Weight has been stable as per pts wife  Prior visit 10/2023 Patient presents with his wife for the visit, denies chest pain or shortness of breath, no palpitations, reports feeling well, gets tired when walk more, [place] a cruise to [place] but used scooter to get around mostly. Pending CT ab/pel as well, last colonoscopy >5yrs.    Prior visit 8/2023:  Patient presents with his wife for the visit, reports has been off carvedilol 25mg recently as well, had recent CT chest with small L-pleural effusion, denies shortness of breath, lost about 30 lbs since 4/2023, bothered by chronic diarrhea taking Imotil, pending GI eval, last colonoscopy 3yrs ago with another GI. No recent fall but no ambulating much.   Prior visit 6/2023:  Pt is accompanied by his wife BP noted to be low, pt states feels fine Has BP machine at home, does not follow BP at home Lost weight while sick with cough/ URI, but now feeling well, eating well ate cheerios this morning with 1/2 cup coffee, no water,  Denies chest pain, SOB/KRAUS, PND, orthopnea, LE edema, palpitations, lightheadedness,syncope or any bleeding episodes Not yet followed up with EP    Prior visit 4/18/2023 Pt is accompanied by his wife Umbilical hernia repair was successful, no complications, remained on ASA with no bleeding Pt  is seen today for URI symptoms which began last week, began with runny nose, cough, unable to expectorate mucus, fatigue, wife noticed labored breathing,  O2 sat 90-92%, went to [facility] urgent care on 4/14/23, negative for FLU and COVID, chest XRAY showed clear lungs, no pleural effusion. Wife had Cefadroxil at home which she began giving the pt 2 days ago and is now seeing an improvement. Pt is feeling better, still with cough but O2 sat has improved, energy has improved, eating drinking well, denies fever/chills,or  sore throat. Denies chest pain, SOB, LE edema, dizziness, near syncope, syncope   Prior Note 2/16/2023 He is accompanied by his wife Pt and wife state they travel often and do not want any complications from the umbilical hernia which has  recently increased in size. He reports feeling well, good energy level He started exercising at the gym 3 weeks ago doing 15 min on stationary bike and lifting light weight, without any symptoms He denies any chest pain, SOB/KRAUS, orthopnea, PND, palpitations, dizziness, lightheadedness, falls, syncope  He has chronic minimal LE edema noticed by " tight socks leaving marks" but denies any worsening edema, stopped wearing compression stockings  he has a h/o IGNACIA, not compliant with CPAP BP noted to be running low but reports he is asymptomatic, does not monitor BP at home    Prior Note 11/2022 Patient presents with his wife for the visit, recently returned from [place] last weekend reports noticing increased legs swelling, had been eating out more in [place], denies shortness of breath, no recent fall. Denies symptoms from umbilical hernia. Wife also noted he has hearing impairment and worsening of his memory loss, missed neurology follow up appointment.    Prior visit 8/2022:  Patient presents with his wife for the visit.  Reports feeling well, shortness of breath resolved, but has gained weight, not been exercising recently. Denies chest pain or palpitations, no fall.    Not yet obtain 2nd COVID vaccine booster.    Prior visit 7/2022:  Eugene is accompanied by his wife for the visit. Has shortness of breath since this morning with lowest O2 sat of 90% (RA) at home. He has dry cough according to his wife since yesterday; does not get worse when he's in bed. Wife states that he seem lethargic since morning. Eugene denies chest pain; no weight gain since last month's visit and no LE edema. Has scattered expiratory wheeze, but no crackles. He had used only one dose of albuterol inhaler today.   Prior visit 5/3/2022 Patient presents with his wife for the visit.  Reports feeling well, no recent fall, memory about the same, been going between [place] and has sunburn. Recently he has been going to the gym using stationary bicycle, no shortness of breath or chest discomfort.   Not yet receive 2nd COVID vaccine booster   Prior visit 8/2021 Patient presents with his wife for the visit, still has MCOT device on until next week, does not feels any symptoms from Afib RVR, she reports HR been slowing down since increase with carvedilol dose, BP also been better controlled, no recent fall or bleeding, using cane to ambulate and receiving home PT, still off Xarelto and pending repeat CT head in 2 weeks.    Last visit 8/17/21:  Today his presents with his wife (who is a nurse) who states he has increased fatigue and SOB with exertion, HR 130s, and swelling. He was restarted on lasix and spironolactone added last week for b/l leg edema and abd distention. Wife states she gave him 80 mg of lasix yesterday, due to increased edema. She states he lost 5 bs over the last week. He denies chest pain, palpitations, orthopnea (sleeps with 2 pilows- states this is his norm), near syncope or syncope. He reports he feels ok now, but this morning told his wife "I feel like I'm dying". EKG is AF with RVR.    Prior visit 6/2021:  Patient presents with his wife who is an RN for the visit. Reports feeling well since discharge, no shortness of breath or orthopnea, LE swellings stable, has baseline exertional dyspnea and limited ambulation, pending appointment to see pulmonary. He denies any palpitations or dizziness, wife noticed HR of 58 once last week, however review of his discharge med list revealed he was incorrectly resumed back on diltiazem 360mg as well.   Completed Moderna COVID vaccine 3 months ago.   6/2021:  , , HDL 25, LDL 87 A1c 5.5%

## 2025-04-07 NOTE — CARDIOLOGY SUMMARY
[de-identified] : 6/21/21- Afib 63, LAFB, QTc 435 8/23/21- Afib 112, LAFB, QTc 453 5/3/22- Afib 77 short pause <2 seconds, LAFB, QTc 429 11/14/22- Afib 65, LAFB, PVC, QTc 420 02/16/2023  Afib 81 LAFB, QTc 460 04/18/2023 Afib 84, QTc 422 6/12/2023 Afib 64, QTc 403 10/5/23- Afib 98, LAFB, QTc 477 2/12/24 Afib 63 LAFB, QTc 430 5/21/24 Afib 79. PVC x1, nonspecific ST, QTc 455 9/30/24 Afib 81, left axis, nonspecific T-wave, QTc 476 12/17/2024: Afib 89, left axis and nonspecific T abnormality, consistent with prior. QTc 471 [de-identified] : 6/2022 Pharmacologic nuclear stress test: very mild distal anterior wall attenuation artifact, no clear evidence of ischemia or infarct  [de-identified] : 6/1/22- LV EF 53%, mild LVH,  inferior/inferoseptal mild hypokinesis, PASP 33 mmHg, mild biatrial enlargement\par  \par  2/2022- \par  Summary:\par   1. Technically fair study. Due to patient's respiratory status, limited \par  images were obtained.\par   2. Moderately decreased global left ventricular systolic function.\par   3. Left ventricular ejection fraction, by visual estimation, is 30 to \par  35%.\par   4. Left atrial enlargement.\par   5. Mildly reduced RV systolic function.\par   6. There is a well seated WATCHMAN device occluding the left atrai \par  lappendage. The device is free of thrombus. There is no significant color \par  flow around the device.\par   7. Normal right atrial size.\par   8. Mild mitral valve regurgitation.\par   9. Mild tricuspid regurgitation.\par  10. There is no evidence of pericardial effusion.\par  \par  6/10/21- Summary: \par   1. Endocardial visualization was enhanced with intravenous echo contrast. \par   2. Left ventricular ejection fraction, by visual estimation, is 40 to 45%. \par   3. Mildly to moderately decreased global left ventricular systolic function. \par   4. Basal and mid anterior septum and basal and mid inferior septum are abnormal as described above. \par   5. The mitral in-flow pattern reveals no discernable A-wave, therefore no comment on diastolic function can be made. \par   6. There is mild concentric left ventricular hypertrophy. \par   7. Normal left ventricular internal cavity size. \par   8. Severely enlarged left atrium. \par   9. Moderately enlarged right atrium. \par  10. Mild mitral annular calcification. \par  11. Mild thickening and calcification of the anterior and posterior mitral valve leaflets. \par  12. Mild mitral valve regurgitation. \par  13. Mild aortic valve stenosis. \par  14. Mild aortic regurgitation. \par  15. There is no evidence of pericardial effusion.

## 2025-04-07 NOTE — HISTORY OF PRESENT ILLNESS
[FreeTextEntry1] : 81M with persistent Afib on Xarelto(EF45-50% 12/2020), COPD, obesity (BMI 34), IGNACIA, hypothyroidism, and asthma previously follows with outside cardiologist (Dr. Piedra), presented to the ED by ambulance with recurrent ADHF, flash pulmonary edema required BiPAP placement and persistent Afib RVR on 6/9-6/15/21 (prior admissions 12/2020) found with worsening LV EF 40-45%, RAISSA (Cr. 1.4), change in mental state and CT/MRI head confirmed nontraumatic intracerebral hemorrhage monitored off Xarelto per neurosurgery for 2 weeks, seen by EPS/Dr. Peacock for elective outpatient Watchman, added digoxin 0.125mg for Afib rate control, presented for initial outpatient cardiology visit on 6/2021, readmitted 7/2021 for mechanical fall with head trauma and facial laceration, carvedilol dose reduced to 12.5mg BID and discontinued digoxin due to bradycardia 40s, increased lisinopril 40mg and added spironolactone 25mg , last office visit 8/17/21 with Afib -150s and MCOT placed increased carvedilol back to 25mg BID, also with recently diagnosed Alzheimer dementia, last seen for general cardiology visit on 8/2021, interval had Watchman procedure done 11/2021 discontinued Xarelto 2/2022, Medtronic ILR implant, LV EF on FREDDY 30-35%, positive for COVID in 1/2022, underwent FREDDY  on 2/2/2022 post Watchman which revealed Watchman in good position, no leaks or thrombus, recent breast soreness had mammogram found gynecomastia been on spironolactone since 8/2021, switched to eplerenone last 5/3. Had repeat Echocardiogram 6/1/2022 with LVEF improved to 50-55% but with regional wall abnormalities in the inferior and inferoseptal walls; underwent pharmacological NST last 6/16 due RWMA in Echo and NSVT on Loop Recorder - no clear evidence of ischemia; repeat Echo 6/2022 with LV EF 50-55%, seen 7/2022 with acute visit for dyspnea/wheezing no signs of heart failure and CXR negative, suspect COPD exacerbation was given Medrol dose pack and azithromycin by pulmonary, seen on 2/2023 pre-operative cardiac assessment prior to robotic umbilical hernia repair with Dr Sosa, recovered well, no complications, last seen 6/2023 with SBP 90 discontinue Entresto, had repeat FREDDY on 6/2023 LVEF continues 50--55% and with small (2.7 mm) leak around his Watchman device but no DRT, prior visit 10/2023 for preprocedural cardiac risk eval prior to EGD/colonoscopy, awaiting MRI Abdomen due to lesion of left kidney as per nephrology, last seen 5/2024, last seen 12/2024 interval had recurrent falls x3 in one week resulted with L-knee hemoarthrosis discharged to rehab for 3 months, returns for follow up.  Patient presents with his spouse for the visit, reports feeling well no recurrent falls since discharge from rehab, able to do some walking at home with HHA also with home PT, planning travel to [place] for 2 weeks and possible cruise to [place].    Prior visit 12/2024:  Reports feeling well. Accompanied by his long term girlfriend Jonelle. Reports feeling good. Denies chest pain, SOB at rest, KRAUS, palpitations, lightheadedness, dizziness, fatigue, syncope, near syncope and LE edema. States his PCP recommended vascular consult due to right foot pain which occurred last week, minor foot swelling and skin redness. Patient states right foot symptoms have now resolved, although Jonelle reports redness is still slightly there. Denies smoking, excessive alcohol and illicit drug use. Denies recent falls or bleeding episodes. Compliant with meds. Reports limited mobility, uses a cane and holds onto another person for assistance. He is pending the results of a brain MRI ordered by Neurology.    Prior visit 9/30/2024: Patient presents with his wife for the visit, reports feeling well, has not been active with ambulation but no recent fall, has not been to PT, planning on trip to [place] later this week. Had CT head done in August questionable for NPH pending neurology follow up. Monthly ILR with rate controlled Afib without alerts.    Prior visit 5/2024:  Patient presents with his wife for the visit, reports feeling well but had colonoscopy told normal still having diarrhea requires daily use of Imodium, wife reports his gait is unsteady pending to get a walker to ambulate, has not been to PT for few years. Planning travels to different locations next few months with his wife.    Prior visit 2/2024:  Presents with his wife. No complaints she states he has been doing well. Denies chest pain, SOB/KRAUS, PND, orthopnea, LE edema, palpitations, lightheadedness, syncope or any bleeding episodes.  unclear if pt is taking Eplerenone, wife knows he was taking it unsure if he still is.  Weight has been stable as per pts wife  Prior visit 10/2023 Patient presents with his wife for the visit, denies chest pain or shortness of breath, no palpitations, reports feeling well, gets tired when walk more, [place] a cruise to [place] but used scooter to get around mostly. Pending CT ab/pel as well, last colonoscopy >5yrs.    Prior visit 8/2023:  Patient presents with his wife for the visit, reports has been off carvedilol 25mg recently as well, had recent CT chest with small L-pleural effusion, denies shortness of breath, lost about 30 lbs since 4/2023, bothered by chronic diarrhea taking Imotil, pending GI eval, last colonoscopy 3yrs ago with another GI. No recent fall but no ambulating much.   Prior visit 6/2023:  Pt is accompanied by his wife BP noted to be low, pt states feels fine Has BP machine at home, does not follow BP at home Lost weight while sick with cough/ URI, but now feeling well, eating well ate cheerios this morning with 1/2 cup coffee, no water,  Denies chest pain, SOB/KRAUS, PND, orthopnea, LE edema, palpitations, lightheadedness,syncope or any bleeding episodes Not yet followed up with EP    Prior visit 4/18/2023 Pt is accompanied by his wife Umbilical hernia repair was successful, no complications, remained on ASA with no bleeding Pt  is seen today for URI symptoms which began last week, began with runny nose, cough, unable to expectorate mucus, fatigue, wife noticed labored breathing,  O2 sat 90-92%, went to [facility] urgent care on 4/14/23, negative for FLU and COVID, chest XRAY showed clear lungs, no pleural effusion. Wife had Cefadroxil at home which she began giving the pt 2 days ago and is now seeing an improvement. Pt is feeling better, still with cough but O2 sat has improved, energy has improved, eating drinking well, denies fever/chills,or  sore throat. Denies chest pain, SOB, LE edema, dizziness, near syncope, syncope   Prior Note 2/16/2023 He is accompanied by his wife Pt and wife state they travel often and do not want any complications from the umbilical hernia which has  recently increased in size. He reports feeling well, good energy level He started exercising at the gym 3 weeks ago doing 15 min on stationary bike and lifting light weight, without any symptoms He denies any chest pain, SOB/KRAUS, orthopnea, PND, palpitations, dizziness, lightheadedness, falls, syncope  He has chronic minimal LE edema noticed by " tight socks leaving marks" but denies any worsening edema, stopped wearing compression stockings  he has a h/o IGNACIA, not compliant with CPAP BP noted to be running low but reports he is asymptomatic, does not monitor BP at home    Prior Note 11/2022 Patient presents with his wife for the visit, recently returned from [place] last weekend reports noticing increased legs swelling, had been eating out more in [place], denies shortness of breath, no recent fall. Denies symptoms from umbilical hernia. Wife also noted he has hearing impairment and worsening of his memory loss, missed neurology follow up appointment.    Prior visit 8/2022:  Patient presents with his wife for the visit.  Reports feeling well, shortness of breath resolved, but has gained weight, not been exercising recently. Denies chest pain or palpitations, no fall.    Not yet obtain 2nd COVID vaccine booster.    Prior visit 7/2022:  Eugene is accompanied by his wife for the visit. Has shortness of breath since this morning with lowest O2 sat of 90% (RA) at home. He has dry cough according to his wife since yesterday; does not get worse when he's in bed. Wife states that he seem lethargic since morning. Eugene denies chest pain; no weight gain since last month's visit and no LE edema. Has scattered expiratory wheeze, but no crackles. He had used only one dose of albuterol inhaler today.   Prior visit 5/3/2022 Patient presents with his wife for the visit.  Reports feeling well, no recent fall, memory about the same, been going between [place] and has sunburn. Recently he has been going to the gym using stationary bicycle, no shortness of breath or chest discomfort.   Not yet receive 2nd COVID vaccine booster   Prior visit 8/2021 Patient presents with his wife for the visit, still has MCOT device on until next week, does not feels any symptoms from Afib RVR, she reports HR been slowing down since increase with carvedilol dose, BP also been better controlled, no recent fall or bleeding, using cane to ambulate and receiving home PT, still off Xarelto and pending repeat CT head in 2 weeks.    Last visit 8/17/21:  Today his presents with his wife (who is a nurse) who states he has increased fatigue and SOB with exertion, HR 130s, and swelling. He was restarted on lasix and spironolactone added last week for b/l leg edema and abd distention. Wife states she gave him 80 mg of lasix yesterday, due to increased edema. She states he lost 5 bs over the last week. He denies chest pain, palpitations, orthopnea (sleeps with 2 pilows- states this is his norm), near syncope or syncope. He reports he feels ok now, but this morning told his wife "I feel like I'm dying". EKG is AF with RVR.    Prior visit 6/2021:  Patient presents with his wife who is an RN for the visit. Reports feeling well since discharge, no shortness of breath or orthopnea, LE swellings stable, has baseline exertional dyspnea and limited ambulation, pending appointment to see pulmonary. He denies any palpitations or dizziness, wife noticed HR of 58 once last week, however review of his discharge med list revealed he was incorrectly resumed back on diltiazem 360mg as well.   Completed Moderna COVID vaccine 3 months ago.   6/2021:  , , HDL 25, LDL 87 A1c 5.5%

## 2025-04-18 NOTE — ASSESSMENT
[FreeTextEntry1] : Hypothyroidism will check tfts today Continue levothyroxine 200 mcg QAM  Dementia continue memantine 10mg daily and donepezil 10mg daily continue to follow with neuro  HTN CKD-3 Controlled on current regimen. Managed by cardio. Advised to avoid nephrotoxic medications, increase his oral hydration.  COPD following with pulm continue trelegy ellipta and levalbuterol  A-fib, rate controlled. Managed by cardio. continue digoxin 125mcg every other day and metoprolol succinate 100mg daily S/p Watchman, now off xarelto  CHF euvolemic on exam on furosemide 40mg daily

## 2025-04-18 NOTE — HISTORY OF PRESENT ILLNESS
[de-identified] : 82 y/o male with pmhx of HLD, Alzheimer's, hypothyroidism, a-fib s/p Xarelto now on DAPT per cardio, CHF (EF 45%), COPD/asthma, IGNACIA on CPAP, HTN, s/p Watchman and ILR on 11/09/2021 presents for follow up. Patient accompanied by wife. Patient was admitted to [facility] due to recurrent falls. Patient was discharged to rehab where he was for 3 months. He had pt and ot and did very well with it. Wife states that since he has been home he has been declining both mentally and physically. He has 12 hours of home care daily. He will be seeing neuro for follow up next month

## 2025-04-18 NOTE — PHYSICAL EXAM
[No Edema] : there was no peripheral edema [No Focal Deficits] : no focal deficits [Normal] : affect was normal and insight and judgment were intact [de-identified] : chronic skin discoloration b/l LE

## 2025-05-16 NOTE — ASSESSMENT
[FreeTextEntry1] : This is an 81-year-old man with mild to moderate Alzheimer's disease.  At this point we will continue donepezil and Namenda.  His aide will be able do physical therapy with him.  If this becomes an issue I can was write a prescription.  His exam today did not show signs of parkinsonism, I would not prescribe Sinemet or do a DaTscan at this time.  I will see him back in 6 months, sooner should the need arise.

## 2025-05-16 NOTE — PHYSICAL EXAM
[Person] : oriented to person [Place] : oriented to place [Time] : disoriented to time [Short Term Intact] : short term memory impaired [Remote Intact] : remote memory intact [Registration Intact] : recent registration memory intact [Span Intact] : the attention span was decreased [Concentration Intact] : normal concentrating ability [Visual Intact] : visual attention was ~T not ~L decreased [Naming Objects] : no difficulty naming common objects [Repeating Phrases] : no difficulty repeating a phrase [Fluency] : fluency intact [Comprehension] : comprehension intact [Past History] : adequate knowledge of personal past history [Cranial Nerves Optic (II)] : visual acuity intact bilaterally,  visual fields full to confrontation, pupils equal round and reactive to light [Cranial Nerves Oculomotor (III)] : extraocular motion intact [Cranial Nerves Trigeminal (V)] : facial sensation intact symmetrically [Cranial Nerves Facial (VII)] : face symmetrical [Cranial Nerves Vestibulocochlear (VIII)] : hearing was intact bilaterally [Cranial Nerves Glossopharyngeal (IX)] : tongue and palate midline [Cranial Nerves Accessory (XI - Cranial And Spinal)] : head turning and shoulder shrug symmetric [Cranial Nerves Hypoglossal (XII)] : there was no tongue deviation with protrusion [Motor Tone] : muscle tone was normal in all four extremities [Motor Strength] : muscle strength was normal in all four extremities [Involuntary Movements] : no involuntary movements were seen [No Muscle Atrophy] : normal bulk in all four extremities [Paresis Pronator Drift Right-Sided] : no pronator drift on the right [Paresis Pronator Drift Left-Sided] : no pronator drift on the left [Motor Strength Upper Extremities Bilaterally] : strength was normal in both upper extremities [Motor Strength Lower Extremities Bilaterally] : strength was normal in both lower extremities [Sensation Tactile Decrease] : light touch was intact [Sensation Pain / Temperature Decrease] : pain and temperature was intact [Sensation Vibration Decrease] : vibration was intact [Proprioception] : proprioception was intact [Tremor] : no tremor present [Coordination - Dysmetria Impaired Finger-to-Nose Bilateral] : not present [1+] : Patella left 1+ [FreeTextEntry4] : 2 out of 3 recall at 5 minutes [FreeTextEntry8] : In transport wheelchair in office uses walker at home [Sclera] : the sclera and conjunctiva were normal [PERRL With Normal Accommodation] : pupils were equal in size, round, reactive to light, with normal accommodation [Extraocular Movements] : extraocular movements were intact [No APD] : no afferent pupillary defect [No ALTON] : no internuclear ophthalmoplegia [Full Visual Field] : full visual field

## 2025-05-16 NOTE — CONSULT LETTER
[Dear  ___] : Dear  [unfilled], [Courtesy Letter:] : I had the pleasure of seeing your patient, [unfilled], in my office today. [Please see my note below.] : Please see my note below. [Consult Closing:] : Thank you very much for allowing me to participate in the care of this patient.  If you have any questions, please do not hesitate to contact me. [Sincerely,] : Sincerely, [FreeTextEntry3] : Toñito Toro M.D., Ph.D. DPN-N [facility], [facility] [place] Director, Division of Neurology Director, Comprehensive St[facility]-

## 2025-05-16 NOTE — HISTORY OF PRESENT ILLNESS
[FreeTextEntry1] : Initial office visit July 30, 2021: This is a 78-year-old man presents today for evaluation of memory loss. His wife accompanies him and provides additional history. She states over the past 2 years she's been having progressive memory problems. This is also accompanied by word finding difficulties and difficulty with her. He is repeating things he needs things repeated to him, he is having trouble managing his checkbook and his daughter has taken over that function. He is not driving currently. He is not wandering. He is having some aggressive behavior at times and prostration of memory loss. He is here today for neurologic evaluation.  Followup September 27, 2021: This is a 78-year-old man who presents today for followup of memory loss and agitation. His wife accompanies him and tells me that he is doing better. He has difficulty with finding words as well as some of his agitation aggression have improved since starting on the had an episode. He is not experiencing any side effects. He is here today for followup.  Followup February 15, 2022: This is a 78-year-old man who presents today for followup of dementia. His wife accompanies him and provides additional history. He has been fairly stable with recent memory loss. His wife states that he does reminisce a lot more about the past and having some trouble remembering some of the details. He has not progressed significantly since his last visit in September. He is here today for neurologic followup. He continues to take Aricept 10 mg daily.  Follow-up November 30, 2022: This is a 79-year-old man who presents today with dementia.  His wife accompanies him and provides additional history.  She states that his memory is a little bit worse than when he saw him last in February.  She states his judgment and decision-making skills however remain okay.  He continues to take Aricept 10 mg daily without problems.  He is here today for neurologic follow-up.  Follow-up March 10, 2023: This is a 79-year-old man who presents today with dementia.  His wife accompanies him and provides additional history.  She states that he has been fairly stable on donepezil 10 mg daily.  Except for over the past week he has been a bit off, this is likely due to anxiety for upcoming surgery to repair an umbilical hernia next week.  Other than that he has been fairly stable.  He is here today for neurologic follow-up.  Follow-up September 8, 2023: This is an 80-year-old man who presents today with dementia.  His wife accompanies him and provides additional history.  They both state that he has been doing okay.  She states that as long as things are at his pace he is doing well and able to remember.  When he is out with other people if he gets peppered with questions too quickly he may have difficulty answering some questions.  He states that he relies on her for certain things such as the date and she does most of the driving.  He does not drive unattended.  He is overall fairly stable since his last visit.  Follow-up July 23, 2024: This is an 80-year-old man who presents with dementia.  His wife accompanies him.  They state that he is may be a little bit worse.  He is also having problems now with walking.  He is unsteady and almost falls at times.  He has incontinence of urine but that is been present for years.  He is continuing to take donepezil and tolerating it well.  He is here today for follow-up.  Follow-up November 15, 2024: This is an 81-year-old man who presents with dementia.  His wife and aide accompany him.  She feels that that his forgetfulness is getting worse.  He is having language involvement as well.  He is having trouble walking and is unsteady and has been falling.  He also has incontinence of urine.  He has been taking donepezil and tolerating it well.  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  He is here today for neurologic follow-up.  Follow-up May 16, 2025: This is an 81-year-old man with dementia.  His wife as well as an aide accompany him to the appointment today.  The wife feels that he is progressing still.  H is also at times a bit nasty to both his wife and his 8.  He had spent time in rehab and his wife relates that physical and occupational therapist were concerned about possible parkinsonism.  He is here today for neurologic follow-up.

## 2025-07-07 NOTE — REVIEW OF SYSTEMS
[Chest Discomfort] : no chest discomfort [Joint Pain] : joint pain [Memory Lapses Or Loss] : memory lapses or loss [FreeTextEntry2] : limited due to dementia

## 2025-07-07 NOTE — HISTORY OF PRESENT ILLNESS
[FreeTextEntry1] : 81M with persistent Afib on Xarelto(EF45-50% 12/2020), COPD, obesity (BMI 34), IGNACIA, hypothyroidism, and asthma previously follows with outside cardiologist (Dr. Piedra), presented to the ED by ambulance with recurrent ADHF, flash pulmonary edema required BiPAP placement and persistent Afib RVR on 6/9-6/15/21 (prior admissions 12/2020) found with worsening LV EF 40-45%, RAISSA (Cr. 1.4), change in mental state and CT/MRI head confirmed nontraumatic intracerebral hemorrhage monitored off Xarelto per neurosurgery for 2 weeks, seen by EPS/Dr. Peacock for elective outpatient Watchman, added digoxin 0.125mg for Afib rate control, presented for initial outpatient cardiology visit on 6/2021, readmitted 7/2021 for mechanical fall with head trauma and facial laceration, carvedilol dose reduced to 12.5mg BID and discontinued digoxin due to bradycardia 40s, increased lisinopril 40mg and added spironolactone 25mg , last office visit 8/17/21 with Afib -150s and MCOT placed increased carvedilol back to 25mg BID, also with recently diagnosed Alzheimer dementia, last seen for general cardiology visit on 8/2021, interval had Watchman procedure done 11/2021 discontinued Xarelto 2/2022, [employer] ILR implant, LV EF on FREDDY 30-35%, positive for COVID in 1/2022, underwent FREDDY  on 2/2/2022 post Watchman which revealed Watchman in good position, no leaks or thrombus, recent breast soreness had mammogram found gynecomastia been on spironolactone since 8/2021, switched to eplerenone last 5/3. Had repeat Echocardiogram 6/1/2022 with LVEF improved to 50-55% but with regional wall abnormalities in the inferior and inferoseptal walls; underwent pharmacological NST last 6/16 due RWMA in Echo and NSVT on Loop Recorder - no clear evidence of ischemia; repeat Echo 6/2022 with LV EF 50-55%, seen 7/2022 with acute visit for dyspnea/wheezing no signs of heart failure and CXR negative, suspect COPD exacerbation was given Medrol dose pack and azithromycin by pulmonary, seen on 2/2023 pre-operative cardiac assessment prior to robotic umbilical hernia repair with Dr Sosa, recovered well, no complications, last seen 6/2023 with SBP 90 discontinue Entresto, had repeat FREDDY on 6/2023 LVEF continues 50--55% and with small (2.7 mm) leak around his Watchman device but no DRT, prior visit 10/2023 for preprocedural cardiac risk eval prior to EGD/colonoscopy, awaiting MRI Abdomen due to lesion of left kidney as per nephrology, last seen 5/2024, last seen 12/2024 interval had recurrent falls x3 in one week resulted with L-knee hemoarthrosis discharged to rehab for 3 months, last seen 4/2025, returns for follow up.  Patient presents with his spouse and HHA for the visit, had recent treatment for UTI and was started on Seroquel by [facility] for agitated delirium at time but became lethargic with use, limited ambulation and no recent fall, planning travel to [place] again in September Recent ILR rate controlled Afib  Recent lab Cr. 1.62, GFR 42   Prior visit 4/2025:  Patient presents with his spouse for the visit, reports feeling well no recurrent falls since discharge from rehab, able to do some walking at home with HHA also with home PT using walker, but 3 of his falls occurred while he was standing with the walker suddenly his legs gave out, denies syncope, they're planning travel to [place] for 2 weeks and possible cruise to [place].  ILR interrogation from 1/2025 with 100% Afib rate controlled.  Lab 12/2024: Cr. 1.38 GFR 51   Prior visit 12/2024:  Reports feeling well. Accompanied by his long term girlfriend Jonelle. Reports feeling good. Denies chest pain, SOB at rest, KRAUS, palpitations, lightheadedness, dizziness, fatigue, syncope, near syncope and LE edema. States his PCP recommended vascular consult due to right foot pain which occurred last week, minor foot swelling and skin redness. Patient states right foot symptoms have now resolved, although Joenlle reports redness is still slightly there. Denies smoking, excessive alcohol and illicit drug use. Denies recent falls or bleeding episodes. Compliant with meds. Reports limited mobility, uses a cane and holds onto another person for assistance. He is pending the results of a brain MRI ordered by Neurology.    Prior visit 9/30/2024: Patient presents with his wife for the visit, reports feeling well, has not been active with ambulation but no recent fall, has not been to PT, planning on trip to [place] later this week. Had CT head done in August questionable for NPH pending neurology follow up. Monthly ILR with rate controlled Afib without alerts.    Prior visit 5/2024:  Patient presents with his wife for the visit, reports feeling well but had colonoscopy told normal still having diarrhea requires daily use of Imodium, wife reports his gait is unsteady pending to get a walker to ambulate, has not been to PT for few years. Planning travels to different locations next few months with his wife.    Prior visit 2/2024:  Presents with his wife. No complaints she states he has been doing well. Denies chest pain, SOB/KRAUS, PND, orthopnea, LE edema, palpitations, lightheadedness, syncope or any bleeding episodes.  unclear if pt is taking Eplerenone, wife knows he was taking it unsure if he still is.  Weight has been stable as per pts wife  Prior visit 10/2023 Patient presents with his wife for the visit, denies chest pain or shortness of breath, no palpitations, reports feeling well, gets tired when walk more, [place] a cruise to [place] but used scooter to get around mostly. Pending CT ab/pel as well, last colonoscopy >5yrs.    Prior visit 8/2023:  Patient presents with his wife for the visit, reports has been off carvedilol 25mg recently as well, had recent CT chest with small L-pleural effusion, denies shortness of breath, lost about 30 lbs since 4/2023, bothered by chronic diarrhea taking Imotil, pending GI eval, last colonoscopy 3yrs ago with another GI. No recent fall but no ambulating much.   Prior visit 6/2023:  Pt is accompanied by his wife BP noted to be low, pt states feels fine Has BP machine at home, does not follow BP at home Lost weight while sick with cough/ URI, but now feeling well, eating well ate cheerios this morning with 1/2 cup coffee, no water,  Denies chest pain, SOB/KRAUS, PND, orthopnea, LE edema, palpitations, lightheadedness,syncope or any bleeding episodes Not yet followed up with EP    Prior visit 4/18/2023 Pt is accompanied by his wife Umbilical hernia repair was successful, no complications, remained on ASA with no bleeding Pt  is seen today for URI symptoms which began last week, began with runny nose, cough, unable to expectorate mucus, fatigue, wife noticed labored breathing,  O2 sat 90-92%, went to [facility] urgent care on 4/14/23, negative for FLU and COVID, chest XRAY showed clear lungs, no pleural effusion. Wife had Cefadroxil at home which she began giving the pt 2 days ago and is now seeing an improvement. Pt is feeling better, still with cough but O2 sat has improved, energy has improved, eating drinking well, denies fever/chills,or  sore throat. Denies chest pain, SOB, LE edema, dizziness, near syncope, syncope   Prior Note 2/16/2023 He is accompanied by his wife Pt and wife state they travel often and do not want any complications from the umbilical hernia which has  recently increased in size. He reports feeling well, good energy level He started exercising at the gym 3 weeks ago doing 15 min on stationary bike and lifting light weight, without any symptoms He denies any chest pain, SOB/KRAUS, orthopnea, PND, palpitations, dizziness, lightheadedness, falls, syncope  He has chronic minimal LE edema noticed by " tight socks leaving marks" but denies any worsening edema, stopped wearing compression stockings  he has a h/o IGNACIA, not compliant with CPAP BP noted to be running low but reports he is asymptomatic, does not monitor BP at home    Prior Note 11/2022 Patient presents with his wife for the visit, recently returned from [place] last weekend reports noticing increased legs swelling, had been eating out more in [place], denies shortness of breath, no recent fall. Denies symptoms from umbilical hernia. Wife also noted he has hearing impairment and worsening of his memory loss, missed neurology follow up appointment.    Prior visit 8/2022:  Patient presents with his wife for the visit.  Reports feeling well, shortness of breath resolved, but has gained weight, not been exercising recently. Denies chest pain or palpitations, no fall.    Not yet obtain 2nd COVID vaccine booster.    Prior visit 7/2022:  Eugene is accompanied by his wife for the visit. Has shortness of breath since this morning with lowest O2 sat of 90% (RA) at home. He has dry cough according to his wife since yesterday; does not get worse when he's in bed. Wife states that he seem lethargic since morning. Eugene denies chest pain; no weight gain since last month's visit and no LE edema. Has scattered expiratory wheeze, but no crackles. He had used only one dose of albuterol inhaler today.   Prior visit 5/3/2022 Patient presents with his wife for the visit.  Reports feeling well, no recent fall, memory about the same, been going between [place] and has sunburn. Recently he has been going to the gym using stationary bicycle, no shortness of breath or chest discomfort.   Not yet receive 2nd COVID vaccine booster   Prior visit 8/2021 Patient presents with his wife for the visit, still has MCOT device on until next week, does not feels any symptoms from Afib RVR, she reports HR been slowing down since increase with carvedilol dose, BP also been better controlled, no recent fall or bleeding, using cane to ambulate and receiving home PT, still off Xarelto and pending repeat CT head in 2 weeks.    Last visit 8/17/21:  Today his presents with his wife (who is a nurse) who states he has increased fatigue and SOB with exertion, HR 130s, and swelling. He was restarted on lasix and spironolactone added last week for b/l leg edema and abd distention. Wife states she gave him 80 mg of lasix yesterday, due to increased edema. She states he lost 5 bs over the last week. He denies chest pain, palpitations, orthopnea (sleeps with 2 pilows- states this is his norm), near syncope or syncope. He reports he feels ok now, but this morning told his wife "I feel like I'm dying". EKG is AF with RVR.    Prior visit 6/2021:  Patient presents with his wife who is an RN for the visit. Reports feeling well since discharge, no shortness of breath or orthopnea, LE swellings stable, has baseline exertional dyspnea and limited ambulation, pending appointment to see pulmonary. He denies any palpitations or dizziness, wife noticed HR of 58 once last week, however review of his discharge med list revealed he was incorrectly resumed back on diltiazem 360mg as well.   Completed Moderna COVID vaccine 3 months ago.   6/2021:  , , HDL 25, LDL 87 A1c 5.5%

## 2025-07-07 NOTE — HISTORY OF PRESENT ILLNESS
[FreeTextEntry1] : 81M with persistent Afib on Xarelto(EF45-50% 12/2020), COPD, obesity (BMI 34), IGNACIA, hypothyroidism, and asthma previously follows with outside cardiologist (Dr. Piedra), presented to the ED by ambulance with recurrent ADHF, flash pulmonary edema required BiPAP placement and persistent Afib RVR on 6/9-6/15/21 (prior admissions 12/2020) found with worsening LV EF 40-45%, RAISSA (Cr. 1.4), change in mental state and CT/MRI head confirmed nontraumatic intracerebral hemorrhage monitored off Xarelto per neurosurgery for 2 weeks, seen by EPS/Dr. Peacock for elective outpatient Watchman, added digoxin 0.125mg for Afib rate control, presented for initial outpatient cardiology visit on 6/2021, readmitted 7/2021 for mechanical fall with head trauma and facial laceration, carvedilol dose reduced to 12.5mg BID and discontinued digoxin due to bradycardia 40s, increased lisinopril 40mg and added spironolactone 25mg , last office visit 8/17/21 with Afib -150s and MCOT placed increased carvedilol back to 25mg BID, also with recently diagnosed Alzheimer dementia, last seen for general cardiology visit on 8/2021, interval had Watchman procedure done 11/2021 discontinued Xarelto 2/2022, Medtronic ILR implant, LV EF on FREDDY 30-35%, positive for COVID in 1/2022, underwent FREDDY  on 2/2/2022 post Watchman which revealed Watchman in good position, no leaks or thrombus, recent breast soreness had mammogram found gynecomastia been on spironolactone since 8/2021, switched to eplerenone last 5/3. Had repeat Echocardiogram 6/1/2022 with LVEF improved to 50-55% but with regional wall abnormalities in the inferior and inferoseptal walls; underwent pharmacological NST last 6/16 due RWMA in Echo and NSVT on Loop Recorder - no clear evidence of ischemia; repeat Echo 6/2022 with LV EF 50-55%, seen 7/2022 with acute visit for dyspnea/wheezing no signs of heart failure and CXR negative, suspect COPD exacerbation was given Medrol dose pack and azithromycin by pulmonary, seen on 2/2023 pre-operative cardiac assessment prior to robotic umbilical hernia repair with Dr Sosa, recovered well, no complications, last seen 6/2023 with SBP 90 discontinue Entresto, had repeat FREDDY on 6/2023 LVEF continues 50--55% and with small (2.7 mm) leak around his Watchman device but no DRT, prior visit 10/2023 for preprocedural cardiac risk eval prior to EGD/colonoscopy, awaiting MRI Abdomen due to lesion of left kidney as per nephrology, last seen 5/2024, last seen 12/2024 interval had recurrent falls x3 in one week resulted with L-knee hemoarthrosis discharged to rehab for 3 months, last seen 4/2025, returns for follow up.  Patient presents with his spouse and HHA for the visit, had recent treatment for UTI and was started on Seroquel by [facility] for agitated delirium at time but became lethargic with use, limited ambulation and no recent fall, planning travel to [place] again in September Recent ILR rate controlled Afib  Recent lab Cr. 1.62, GFR 42   Prior visit 4/2025:  Patient presents with his spouse for the visit, reports feeling well no recurrent falls since discharge from rehab, able to do some walking at home with HHA also with home PT using walker, but 3 of his falls occurred while he was standing with the walker suddenly his legs gave out, denies syncope, they're planning travel to [place] for 2 weeks and possible cruise to [place].  ILR interrogation from 1/2025 with 100% Afib rate controlled.  Lab 12/2024: Cr. 1.38 GFR 51   Prior visit 12/2024:  Reports feeling well. Accompanied by his long term girlfriend Jonelle. Reports feeling good. Denies chest pain, SOB at rest, KRAUS, palpitations, lightheadedness, dizziness, fatigue, syncope, near syncope and LE edema. States his PCP recommended vascular consult due to right foot pain which occurred last week, minor foot swelling and skin redness. Patient states right foot symptoms have now resolved, although Jonelle reports redness is still slightly there. Denies smoking, excessive alcohol and illicit drug use. Denies recent falls or bleeding episodes. Compliant with meds. Reports limited mobility, uses a cane and holds onto another person for assistance. He is pending the results of a brain MRI ordered by Neurology.    Prior visit 9/30/2024: Patient presents with his wife for the visit, reports feeling well, has not been active with ambulation but no recent fall, has not been to PT, planning on trip to [place] later this week. Had CT head done in August questionable for NPH pending neurology follow up. Monthly ILR with rate controlled Afib without alerts.    Prior visit 5/2024:  Patient presents with his wife for the visit, reports feeling well but had colonoscopy told normal still having diarrhea requires daily use of Imodium, wife reports his gait is unsteady pending to get a walker to ambulate, has not been to PT for few years. Planning travels to different locations next few months with his wife.    Prior visit 2/2024:  Presents with his wife. No complaints she states he has been doing well. Denies chest pain, SOB/KRAUS, PND, orthopnea, LE edema, palpitations, lightheadedness, syncope or any bleeding episodes.  unclear if pt is taking Eplerenone, wife knows he was taking it unsure if he still is.  Weight has been stable as per pts wife  Prior visit 10/2023 Patient presents with his wife for the visit, denies chest pain or shortness of breath, no palpitations, reports feeling well, gets tired when walk more, took a cruise to [place] but used scooter to get around mostly. Pending CT ab/pel as well, last colonoscopy >5yrs.    Prior visit 8/2023:  Patient presents with his wife for the visit, reports has been off carvedilol 25mg recently as well, had recent CT chest with small L-pleural effusion, denies shortness of breath, lost about 30 lbs since 4/2023, bothered by chronic diarrhea taking Imotil, pending GI eval, last colonoscopy 3yrs ago with another GI. No recent fall but no ambulating much.   Prior visit 6/2023:  Pt is accompanied by his wife BP noted to be low, pt states feels fine Has BP machine at home, does not follow BP at home Lost weight while sick with cough/ URI, but now feeling well, eating well ate cheerios this morning with 1/2 cup coffee, no water,  Denies chest pain, SOB/KRAUS, PND, orthopnea, LE edema, palpitations, lightheadedness,syncope or any bleeding episodes Not yet followed up with EP    Prior visit 4/18/2023 Pt is accompanied by his wife Umbilical hernia repair was successful, no complications, remained on ASA with no bleeding Pt  is seen today for URI symptoms which began last week, began with runny nose, cough, unable to expectorate mucus, fatigue, wife noticed labored breathing,  O2 sat 90-92%, went to [facility] urgent care on 4/14/23, negative for FLU and COVID, chest XRAY showed clear lungs, no pleural effusion. Wife had Cefadroxil at home which she began giving the pt 2 days ago and is now seeing an improvement. Pt is feeling better, still with cough but O2 sat has improved, energy has improved, eating drinking well, denies fever/chills,or  sore throat. Denies chest pain, SOB, LE edema, dizziness, near syncope, syncope   Prior Note 2/16/2023 He is accompanied by his wife Pt and wife state they travel often and do not want any complications from the umbilical hernia which has  recently increased in size. He reports feeling well, good energy level He started exercising at the gym 3 weeks ago doing 15 min on stationary bike and lifting light weight, without any symptoms He denies any chest pain, SOB/KRAUS, orthopnea, PND, palpitations, dizziness, lightheadedness, falls, syncope  He has chronic minimal LE edema noticed by " tight socks leaving marks" but denies any worsening edema, stopped wearing compression stockings  he has a h/o IGNACIA, not compliant with CPAP BP noted to be running low but reports he is asymptomatic, does not monitor BP at home    Prior Note 11/2022 Patient presents with his wife for the visit, recently returned from [place] last weekend reports noticing increased legs swelling, had been eating out more in [place], denies shortness of breath, no recent fall. Denies symptoms from umbilical hernia. Wife also noted he has hearing impairment and worsening of his memory loss, missed neurology follow up appointment.    Prior visit 8/2022:  Patient presents with his wife for the visit.  Reports feeling well, shortness of breath resolved, but has gained weight, not been exercising recently. Denies chest pain or palpitations, no fall.    Not yet obtain 2nd COVID vaccine booster.    Prior visit 7/2022:  Eugene is accompanied by his wife for the visit. Has shortness of breath since this morning with lowest O2 sat of 90% (RA) at home. He has dry cough according to his wife since yesterday; does not get worse when he's in bed. Wife states that he seem lethargic since morning. Eugene denies chest pain; no weight gain since last month's visit and no LE edema. Has scattered expiratory wheeze, but no crackles. He had used only one dose of albuterol inhaler today.   Prior visit 5/3/2022 Patient presents with his wife for the visit.  Reports feeling well, no recent fall, memory about the same, been going between [place] and has sunburn. Recently he has been going to the gym using stationary bicycle, no shortness of breath or chest discomfort.   Not yet receive 2nd COVID vaccine booster   Prior visit 8/2021 Patient presents with his wife for the visit, still has MCOT device on until next week, does not feels any symptoms from Afib RVR, she reports HR been slowing down since increase with carvedilol dose, BP also been better controlled, no recent fall or bleeding, using cane to ambulate and receiving home PT, still off Xarelto and pending repeat CT head in 2 weeks.    Last visit 8/17/21:  Today his presents with his wife (who is a nurse) who states he has increased fatigue and SOB with exertion, HR 130s, and swelling. He was restarted on lasix and spironolactone added last week for b/l leg edema and abd distention. Wife states she gave him 80 mg of lasix yesterday, due to increased edema. She states he lost 5 bs over the last week. He denies chest pain, palpitations, orthopnea (sleeps with 2 pilows- states this is his norm), near syncope or syncope. He reports he feels ok now, but this morning told his wife "I feel like I'm dying". EKG is AF with RVR.    Prior visit 6/2021:  Patient presents with his wife who is an RN for the visit. Reports feeling well since discharge, no shortness of breath or orthopnea, LE swellings stable, has baseline exertional dyspnea and limited ambulation, pending appointment to see pulmonary. He denies any palpitations or dizziness, wife noticed HR of 58 once last week, however review of his discharge med list revealed he was incorrectly resumed back on diltiazem 360mg as well.   Completed Moderna COVID vaccine 3 months ago.   6/2021:  , , HDL 25, LDL 87 A1c 5.5%

## 2025-07-07 NOTE — PHYSICAL EXAM
[Well Developed] : well developed [Well Nourished] : well nourished [No Acute Distress] : no acute distress [Obese] : obese [Normal Conjunctiva] : normal conjunctiva [Normal Venous Pressure] : normal venous pressure [No Carotid Bruit] : no carotid bruit [Normal S1, S2] : normal S1, S2 [No Murmur] : no murmur [No Rub] : no rub [No Gallop] : no gallop [Clear Lung Fields] : clear lung fields [Good Air Entry] : good air entry [No Respiratory Distress] : no respiratory distress  [Soft] : abdomen soft [Non Tender] : non-tender [No Masses/organomegaly] : no masses/organomegaly [Normal Bowel Sounds] : normal bowel sounds [No Edema] : no edema [No Cyanosis] : no cyanosis [No Clubbing] : no clubbing [No Varicosities] : no varicosities [No Rash] : no rash [No Skin Lesions] : no skin lesions [Moves all extremities] : moves all extremities [No Focal Deficits] : no focal deficits [de-identified] : unable to ambulate and need asistance in pulling body to get up to wheelchair [de-identified] : muted response [de-identified] : AAOx 1-2, flat affect

## 2025-07-07 NOTE — CARDIOLOGY SUMMARY
[de-identified] : 6/21/21- Afib 63, LAFB, QTc 435 8/23/21- Afib 112, LAFB, QTc 453 5/3/22- Afib 77 short pause <2 seconds, LAFB, QTc 429 11/14/22- Afib 65, LAFB, PVC, QTc 420 02/16/2023  Afib 81 LAFB, QTc 460 04/18/2023 Afib 84, QTc 422 6/12/2023 Afib 64, QTc 403 10/5/23- Afib 98, LAFB, QTc 477 2/12/24 Afib 63 LAFB, QTc 430 5/21/24 Afib 79. PVC x1, nonspecific ST, QTc 455 9/30/24 Afib 81, left axis, nonspecific T-wave, QTc 476 12/17/2024: Afib 89, left axis and nonspecific T abnormality, consistent with prior. QTc 471 4/7/25: Afib 93, poor R-wave progression, QTc 431 [de-identified] : 6/2022 Pharmacologic nuclear stress test: very mild distal anterior wall attenuation artifact, no clear evidence of ischemia or infarct  [de-identified] : 6/1/22- LV EF 53%, mild LVH,  inferior/inferoseptal mild hypokinesis, PASP 33 mmHg, mild biatrial enlargement\par  \par  2/2022- \par  Summary:\par   1. Technically fair study. Due to patient's respiratory status, limited \par  images were obtained.\par   2. Moderately decreased global left ventricular systolic function.\par   3. Left ventricular ejection fraction, by visual estimation, is 30 to \par  35%.\par   4. Left atrial enlargement.\par   5. Mildly reduced RV systolic function.\par   6. There is a well seated WATCHMAN device occluding the left atrai \par  lappendage. The device is free of thrombus. There is no significant color \par  flow around the device.\par   7. Normal right atrial size.\par   8. Mild mitral valve regurgitation.\par   9. Mild tricuspid regurgitation.\par  10. There is no evidence of pericardial effusion.\par  \par  6/10/21- Summary: \par   1. Endocardial visualization was enhanced with intravenous echo contrast. \par   2. Left ventricular ejection fraction, by visual estimation, is 40 to 45%. \par   3. Mildly to moderately decreased global left ventricular systolic function. \par   4. Basal and mid anterior septum and basal and mid inferior septum are abnormal as described above. \par   5. The mitral in-flow pattern reveals no discernable A-wave, therefore no comment on diastolic function can be made. \par   6. There is mild concentric left ventricular hypertrophy. \par   7. Normal left ventricular internal cavity size. \par   8. Severely enlarged left atrium. \par   9. Moderately enlarged right atrium. \par  10. Mild mitral annular calcification. \par  11. Mild thickening and calcification of the anterior and posterior mitral valve leaflets. \par  12. Mild mitral valve regurgitation. \par  13. Mild aortic valve stenosis. \par  14. Mild aortic regurgitation. \par  15. There is no evidence of pericardial effusion.

## 2025-07-07 NOTE — PHYSICAL EXAM
[Well Developed] : well developed [Well Nourished] : well nourished [No Acute Distress] : no acute distress [Obese] : obese [Normal Conjunctiva] : normal conjunctiva [Normal Venous Pressure] : normal venous pressure [No Carotid Bruit] : no carotid bruit [Normal S1, S2] : normal S1, S2 [No Murmur] : no murmur [No Rub] : no rub [No Gallop] : no gallop [Clear Lung Fields] : clear lung fields [Good Air Entry] : good air entry [No Respiratory Distress] : no respiratory distress  [Soft] : abdomen soft [Non Tender] : non-tender [No Masses/organomegaly] : no masses/organomegaly [Normal Bowel Sounds] : normal bowel sounds [No Edema] : no edema [No Cyanosis] : no cyanosis [No Clubbing] : no clubbing [No Varicosities] : no varicosities [No Rash] : no rash [No Skin Lesions] : no skin lesions [Moves all extremities] : moves all extremities [No Focal Deficits] : no focal deficits [de-identified] : unable to ambulate and need asistance in pulling body to get up to wheelchair [de-identified] : muted response [de-identified] : AAOx 1-2, flat affect

## 2025-07-07 NOTE — CARDIOLOGY SUMMARY
[de-identified] : 6/21/21- Afib 63, LAFB, QTc 435 8/23/21- Afib 112, LAFB, QTc 453 5/3/22- Afib 77 short pause <2 seconds, LAFB, QTc 429 11/14/22- Afib 65, LAFB, PVC, QTc 420 02/16/2023  Afib 81 LAFB, QTc 460 04/18/2023 Afib 84, QTc 422 6/12/2023 Afib 64, QTc 403 10/5/23- Afib 98, LAFB, QTc 477 2/12/24 Afib 63 LAFB, QTc 430 5/21/24 Afib 79. PVC x1, nonspecific ST, QTc 455 9/30/24 Afib 81, left axis, nonspecific T-wave, QTc 476 12/17/2024: Afib 89, left axis and nonspecific T abnormality, consistent with prior. QTc 471 4/7/25: Afib 93, poor R-wave progression, QTc 431 [de-identified] : 6/2022 Pharmacologic nuclear stress test: very mild distal anterior wall attenuation artifact, no clear evidence of ischemia or infarct  [de-identified] : 6/1/22- LV EF 53%, mild LVH,  inferior/inferoseptal mild hypokinesis, PASP 33 mmHg, mild biatrial enlargement\par  \par  2/2022- \par  Summary:\par   1. Technically fair study. Due to patient's respiratory status, limited \par  images were obtained.\par   2. Moderately decreased global left ventricular systolic function.\par   3. Left ventricular ejection fraction, by visual estimation, is 30 to \par  35%.\par   4. Left atrial enlargement.\par   5. Mildly reduced RV systolic function.\par   6. There is a well seated WATCHMAN device occluding the left atrai \par  lappendage. The device is free of thrombus. There is no significant color \par  flow around the device.\par   7. Normal right atrial size.\par   8. Mild mitral valve regurgitation.\par   9. Mild tricuspid regurgitation.\par  10. There is no evidence of pericardial effusion.\par  \par  6/10/21- Summary: \par   1. Endocardial visualization was enhanced with intravenous echo contrast. \par   2. Left ventricular ejection fraction, by visual estimation, is 40 to 45%. \par   3. Mildly to moderately decreased global left ventricular systolic function. \par   4. Basal and mid anterior septum and basal and mid inferior septum are abnormal as described above. \par   5. The mitral in-flow pattern reveals no discernable A-wave, therefore no comment on diastolic function can be made. \par   6. There is mild concentric left ventricular hypertrophy. \par   7. Normal left ventricular internal cavity size. \par   8. Severely enlarged left atrium. \par   9. Moderately enlarged right atrium. \par  10. Mild mitral annular calcification. \par  11. Mild thickening and calcification of the anterior and posterior mitral valve leaflets. \par  12. Mild mitral valve regurgitation. \par  13. Mild aortic valve stenosis. \par  14. Mild aortic regurgitation. \par  15. There is no evidence of pericardial effusion.

## 2025-07-07 NOTE — DISCUSSION/SUMMARY
[FreeTextEntry1] : 81M with persistent Afib on Xarelto(EF45-50% 12/2020), COPD, obesity (BMI 34), IGNACIA, hypothyroidism, and asthma previously follows with outside cardiologist (Dr. Piedra), presented to the ED by ambulance with recurrent ADHF, flash pulmonary edema required BiPAP placement and persistent Afib RVR on 6/9-6/15/21 (prior admissions 12/2020) found with worsening LV EF 40-45%, RAISSA (Cr. 1.4), change in mental state and CT/MRI head confirmed nontraumatic intracerebral hemorrhage monitored off Xarelto per neurosurgery for 2 weeks, seen by EPS/Dr. Peacock for elective outpatient Watchman, added digoxin 0.125mg for Afib rate control, presented for initial outpatient cardiology visit on 6/2021, readmitted 7/2021 for mechanical fall with head trauma and facial laceration, carvedilol dose reduced to 12.5mg BID and discontinued digoxin due to bradycardia 40s, increased lisinopril 40mg and added spironolactone 25mg , last office visit 8/17/21 with Afib -150s and MCOT placed increased carvedilol back to 25mg BID, also with recently diagnosed Alzheimer dementia, last seen for general cardiology visit on 8/2021, interval had Watchman procedure done 11/2021 discontinued Xarelto 2/2022, Medtronic ILR implant, LV EF on FREDDY 30-35%, positive for COVID in 1/2022, underwent FREDDY  on 2/2/2022 post Watchman which revealed Watchman in good position, no leaks or thrombus, recent breast soreness had mammogram found gynecomastia been on spironolactone since 8/2021, switched to eplerenone last 5/3. Had repeat Echocardiogram 6/1/2022 with LVEF improved to 50-55% but with regional wall abnormalities in the inferior and inferoseptal walls; underwent pharmacological NST last 6/16 due RWMA in Echo and NSVT on Loop Recorder - no clear evidence of ischemia; repeat Echo 6/2022 with LV EF 50-55%, seen 7/2022 with acute visit for dyspnea/wheezing no signs of heart failure and CXR negative, suspect COPD exacerbation was given Medrol dose pack and azithromycin by pulmonary, seen on 2/2023 pre-operative cardiac assessment prior to robotic umbilical hernia repair with Dr Sosa, recovered well, no complications, last seen 6/2023 with SBP 90 discontinue Entresto, had repeat FREDDY on 6/2023 LVEF continues 50--55% and with small (2.7 mm) leak around his Watchman device but no DRT, prior visit 10/2023 for preprocedural cardiac risk eval prior to EGD/colonoscopy, awaiting MRI Abdomen due to lesion of left kidney as per nephrology, last seen 5/2024, last seen 12/2024 interval had recurrent falls x3 in one week resulted with L-knee hemoarthrosis discharged to rehab for 3 months, last seen 4/2025, returns for follow up.  Progressive dementia and with limited ambulation, prior recurrent mechanical falls need precaution and continue HHA assist. Permanent Afib rate controlled no alerts of prolonged pauses on ILR remains on low dose digoxin and metoprolol. Needs digoxin level check and repeat BMP with pBNP home drawn.   1.Chronic HFrEF:  recovered EF (50 to 55% 6/2022) Euvolemic on exam continue Lasix 40mg, and metoprolol succinate.    2. HTN: previously hypotensive on Entresto, BP has remained stable off Entresto, to remain off for now  3. Persistent Afib with prior RVR, NSVT- ILR followed by EP no event 4/2024 - rate is now controlled on metoprolol succinate 100mg, continue digoxin 0.125mg every other day, digoxin level was in range on labs 12/2023 needs repeat. On ASA 81 mg daily, no bleeding episodes  -s/p Watchman 11/2021  4. Prior recurrent ICH/SDH : denies falls/ syncope, off AC, on ASA 81 mg  5. Renal lesion: per MRI as L-renal hemorrhagic/proteinaceous cyst.   6. Unsteady gait- at risk for recurrent falls continue PT. Following neurology as well.     Follow up in 3 months.